# Patient Record
Sex: FEMALE | Race: BLACK OR AFRICAN AMERICAN | Employment: FULL TIME | URBAN - METROPOLITAN AREA
[De-identification: names, ages, dates, MRNs, and addresses within clinical notes are randomized per-mention and may not be internally consistent; named-entity substitution may affect disease eponyms.]

---

## 2017-08-29 ENCOUNTER — OFFICE VISIT (OUTPATIENT)
Dept: INTERNAL MEDICINE CLINIC | Age: 27
End: 2017-08-29

## 2017-08-29 ENCOUNTER — OFFICE VISIT (OUTPATIENT)
Dept: OBGYN CLINIC | Age: 27
End: 2017-08-29

## 2017-08-29 VITALS
HEART RATE: 70 BPM | BODY MASS INDEX: 28.77 KG/M2 | DIASTOLIC BLOOD PRESSURE: 70 MMHG | TEMPERATURE: 97 F | HEIGHT: 66 IN | SYSTOLIC BLOOD PRESSURE: 116 MMHG | WEIGHT: 179 LBS | OXYGEN SATURATION: 99 % | RESPIRATION RATE: 16 BRPM

## 2017-08-29 VITALS
HEIGHT: 66 IN | WEIGHT: 179 LBS | SYSTOLIC BLOOD PRESSURE: 112 MMHG | BODY MASS INDEX: 28.77 KG/M2 | DIASTOLIC BLOOD PRESSURE: 70 MMHG

## 2017-08-29 DIAGNOSIS — M54.50 ACUTE LEFT-SIDED LOW BACK PAIN WITHOUT SCIATICA: Primary | ICD-10-CM

## 2017-08-29 DIAGNOSIS — Z01.419 ENCOUNTER FOR GYNECOLOGICAL EXAMINATION WITHOUT ABNORMAL FINDING: Primary | ICD-10-CM

## 2017-08-29 DIAGNOSIS — Z20.2 POSSIBLE EXPOSURE TO STD: ICD-10-CM

## 2017-08-29 DIAGNOSIS — R82.2 BILIRUBINURIA: ICD-10-CM

## 2017-08-29 LAB
BILIRUB UR QL STRIP: NORMAL
GLUCOSE UR-MCNC: NEGATIVE MG/DL
KETONES P FAST UR STRIP-MCNC: NEGATIVE MG/DL
PH UR STRIP: 5.5 [PH] (ref 4.6–8)
PROT UR QL STRIP: NORMAL MG/DL
SP GR UR STRIP: 1.01 (ref 1–1.03)
UA UROBILINOGEN AMB POC: NORMAL (ref 0.2–1)
URINALYSIS CLARITY POC: CLEAR
URINALYSIS COLOR POC: NORMAL
URINE BLOOD POC: NEGATIVE
URINE LEUKOCYTES POC: NEGATIVE
URINE NITRITES POC: NEGATIVE

## 2017-08-29 NOTE — PROGRESS NOTES
HISTORY OF PRESENT ILLNESS  Stephanie Ferguson is a 23 y.o. female. Patient reports left-sided low back pain for 3 weeks. She thought it was from sleeping on a couch, but it has not improved over the last week since sleeping in a bed. Patient states she also has had mild abdominal tenderness of left and right upper quadrant with some cramping and nausea. She has history of gastric sleeve. Last bowel movement was 2 days ago and was normal per patient. She denies fever, chills. She noticed dark urine about 1 week ago. She thought maybe she was just dehydrated, because she has not had a lot of fluid intake recently. Denies urinary urgency, frequency, or dysuria. She has not taken any over the counter medications, such as AZO, or prescription drugs, besides BC powder for the pain. She has been taking BC powder twice daily for the last week. Patient saw gyn today to be tested for STDs. She had pap done and ultrasound ordered to check Mirena placement. Visit Vitals    /70 (BP 1 Location: Left arm, BP Patient Position: Sitting)    Pulse 70    Temp 97 °F (36.1 °C) (Oral)    Resp 16    Ht 5' 6\" (1.676 m)    Wt 179 lb (81.2 kg)    LMP 08/15/2017 (Exact Date)    SpO2 99%    BMI 28.89 kg/m2       LOW BACK PAIN   The history is provided by the patient. This is a new problem. Episode onset: 3 weeks. The problem occurs constantly. The problem has not changed since onset. Treatments tried: BC powder. The treatment provided moderate relief. Review of Systems   Genitourinary:        Dark urine   Musculoskeletal: Positive for back pain (left side). Physical Exam   Constitutional: She is oriented to person, place, and time. She appears well-developed and well-nourished. Abdominal: Normal appearance and bowel sounds are normal. There is no hepatosplenomegaly. There is tenderness (mild tenderness of left and right upper quadrant with deep palpation). There is no CVA tenderness.    Musculoskeletal:   No back pain at time of visit   Neurological: She is alert and oriented to person, place, and time. Skin: Skin is warm and dry. Psychiatric: She has a normal mood and affect. Results for orders placed or performed in visit on 08/29/17   AMB POC URINALYSIS DIP STICK AUTO W/O MICRO   Result Value Ref Range    Color (UA POC) Linda     Clarity (UA POC) Clear     Glucose (UA POC) Negative Negative    Bilirubin (UA POC) 4+ Negative    Ketones (UA POC) Negative Negative    Specific gravity (UA POC) 1.015 1.001 - 1.035    Blood (UA POC) Negative Negative    pH (UA POC) 5.5 4.6 - 8.0    Protein (UA POC) Trace Negative mg/dL    Urobilinogen (UA POC) 8 mg/dL 0.2 - 1    Nitrites (UA POC) Negative Negative    Leukocyte esterase (UA POC) Negative Negative       ASSESSMENT and PLAN    ICD-10-CM ICD-9-CM    1. Acute left-sided low back pain without sciatica M54.5 724.2 AMB POC URINALYSIS DIP STICK AUTO W/O MICRO   2.  Bilirubinuria R82.2 791.4 HEPATITIS PANEL, ACUTE      CBC WITH AUTOMATED DIFF      METABOLIC PANEL, COMPREHENSIVE      BILIRUBIN, FRACTIONATED     Orders Placed This Encounter    HEPATITIS PANEL, ACUTE    CBC WITH AUTOMATED DIFF    METABOLIC PANEL, COMPREHENSIVE    BILIRUBIN, FRACTIONATED    AMB POC URINALYSIS DIP STICK AUTO W/O MICRO   labs sent today  Consider imaging based on lab results  Avoid [facility] powder/tylenol  Try heating pad for pain

## 2017-08-29 NOTE — MR AVS SNAPSHOT
Visit Information Date & Time Provider Department Dept. Phone Encounter #  
 8/29/2017 10:50 AM Guero Danielson MD [place] [facility] Elias [phone] [id number] Your Appointments 8/29/2017  2:15 PM  
ACUTE CARE with CHICHI Dumont [facility] ([address]) Appt Note: back pain  
 [address], [address] [facility] [address]  
[address], [facility]  
  
    
 9/1/2017  8:20 AM  
ESTABLISHED PATIENT with Jonny Mcguire NP  
[facility] [address] ([address]) Appt Note: EP/ yearly check up/ 11/20/2013 Sleeve - EB/$0.00cp/$0.00pb; EP    Patient rescheduled appointment to 09/0l/20l7        vca  
 217 [facility] [address] [facility] [address]  
[address] [place] [address] Upcoming Health Maintenance Date Due INFLUENZA AGE 9 TO ADULT 8/1/2017 PAP AKA CERVICAL CYTOLOGY 3/3/2019 Allergies as of 8/29/2017  Review Complete On: 8/29/2017 By: To Calderon No Known Allergies Current Immunizations  Reviewed on 8/7/2015 Name Date DTAP Vaccine 1/3/1996, 6/23/1994, 7/7/1992, 5/7/1992, 3/7/1992 Hepatitis B Vaccine 4/27/2004, 1/30/2004, 12/9/2003, 8/4/1996, 2/26/1996, 1/3/1996 Human Papillomavirus 10/24/2007, 11/28/2006 IPV 7/3/1996, 1/3/1996, 5/7/1992, 3/7/1992 MMR Vaccine 1/3/1996, 2/18/1993 Meningococcal Vaccine 7/14/2011 TDAP Vaccine 7/14/2011 Tdap 2/15/2013 12:11 AM  
  
 Not reviewed this visit You Were Diagnosed With   
  
 Codes Comments Encounter for gynecological examination without abnormal finding    -  Primary ICD-10-CM: Z12.363 ICD-9-CM: V72.31 Possible exposure to STD     ICD-10-CM: Z20.2 ICD-9-CM: V01.6 Vitals BP Height(growth percentile) Weight(growth percentile) BMI OB Status Smoking Status 112/70 5' 6\" (1.676 m) 179 lb (81.2 kg) 28.89 kg/m2 Having regular periods Never Smoker BMI and BSA Data Body Mass Index Body Surface Area  
 28.89 kg/m 2 1.94 m 2 Preferred Pharmacy Pharmacy Name Phone [facility] [address] [place] [address], [facility] [place] [phone] Your Updated Medication List  
  
   
This list is accurate as of: 8/29/17 10:51 AM.  Always use your most recent med list.  
  
  
  
  
 biotin 500 mcg Cap Take 1,000 mcg by mouth daily. CALCIUM 600 + D 600-125 mg-unit Tab Generic drug:  calcium-cholecalciferol (d3) Take  by mouth. MIRENA 20 mcg/24 hr (5 years) IUD Generic drug:  levonorgestrel 1 Each by IntraUTERine route once. multivitamin tablet Commonly known as:  ONE A DAY Take 2 Tabs by mouth daily. VITAMIN B-12 PO Take  by mouth. We Performed the Following HEP B SURFACE AG [id number] CPT(R)] HEPATITIS C AB [93535 CPT(R)] HIV 1/2 AG/AB, 4TH GENERATION,W RFLX CONFIRM [[id number] Custom] PAP IG, CT-NG TV [address] HPV [id number], [id number]) [id number] CPT(R)] RPR [59380 CPT(R)] Patient Instructions Exposure to Sexually Transmitted Infections: Care Instructions Your Care Instructions Sexually transmitted infections (STIs) are those diseases spread by sexual contact. There are at least 20 different STIs, including chlamydia, gonorrhea, syphilis, and human immunodeficiency virus (HIV), which causes AIDS. Bacteria-caused STIs can be treated and cured. STIs caused by viruses, such as HIV, can be treated but not cured. Some STIs can reduce a woman's chances of getting pregnant in the future. STIs are spread during sexual contact, such as vaginal intercourse and oral or anal sex. Follow-up care is a key part of your treatment and safety.  Be sure to make and go to all appointments, and call your doctor if you are having problems. Its also a good idea to know your test results and keep a list of the medicines you take. How can you care for yourself at home? · Your doctor may have given you a shot of antibiotics. If your doctor prescribed antibiotic pills, take them as directed. Do not stop taking them just because you feel better. You need to take the full course of antibiotics. · Do not have sexual contact while you have symptoms of an STI or are being treated for an STI. · Tell your sex partner (or partners) that he or she will need treatment. · If you are a woman, do not douche. Douching changes the normal balance of bacteria in the vagina and may spread an infection up into your reproductive organs. To prevent exposure to STIs in the future · Use latex condoms every time you have sex. Use them from the beginning to the end of sexual contact. · Talk to your partner before you have sex. Find out if he or she has or is at risk for any STI. Keep in mind that a person may be able to spread an STI even if he or she does not have symptoms. · Do not have sex if you are being treated for an STI. · Do not have sex with anyone who has symptoms of an STI, such as sores on the genitals or mouth. · Having one sex partner (who does not have STIs and does not have sex with anyone else) is a good way to avoid STIs. When should you call for help? Call your doctor now or seek immediate medical care if: 
· You have new pain in your belly or pelvis. · You have symptoms of a urinary tract infection. These may include: 
¨ Pain or burning when you urinate. ¨ A frequent need to urinate without being able to pass much urine. ¨ Pain in the flank, which is just below the rib cage and above the waist on either side of the back. ¨ Blood in your urine. ¨ A fever. · You have new or worsening pain or swelling in the scrotum. Watch closely for changes in your health, and be sure to contact your doctor if: · You have unusual vaginal bleeding. · You have a discharge from the vagina or penis. · You have any new symptoms, such as sores, bumps, rashes, blisters, or warts. · You have itching, tingling, pain, or burning in the genital or anal area. · You think you may have an STI. Where can you learn more? Go to http://laith-hammad.info/. Enter Z103 in the search box to learn more about \"Exposure to Sexually Transmitted Infections: Care Instructions. \" Current as of: March 20, 2017 Content Version: 11.3 © 4755-3204 [employer]. Care instructions adapted under license by [employer] (which disclaims liability or warranty for this information). If you have questions about a medical condition or this instruction, always ask your healthcare professional. [address] any warranty or liability for your use of this information. Introducing [facility]! Dear Quinn Schulz: Thank you for requesting a [employer] account. Our records indicate that you already have an active [employer] account. You can access your account anytime at [email or web address] Did you know that you can access your hospital and ER discharge instructions at any time in [employer]? You can also review all of your test results from your hospital stay or ER visit. Additional Information If you have questions, please visit the Frequently Asked Questions section of the [employer] website at [email or web address] Remember, [employer] is NOT to be used for urgent needs. For medical emergencies, dial 911. Now available from your iPhone and Android! Please provide this summary of care documentation to your next provider. Your primary care clinician is listed as [address]. If you have any questions after today's visit, please call [phone].

## 2017-08-29 NOTE — MR AVS SNAPSHOT
Visit Information Date & Time Provider Department Dept. Phone Encounter #  
 8/29/2017  2:15 PM CHICHI Curiel  [facility] [phone] [id number] Your Appointments 9/1/2017  8:20 AM  
ESTABLISHED PATIENT with Grace Xie NP  
[facility] 22 [address] ([facility]) Appt Note: EP/ yearly check up/ 11/20/2013 Sleeve - EB/$0.00cp/$0.00pb; EP    Patient rescheduled appointment to 09/0l/20l7        vca  
 217 [facility] [address] [facility] [address]  
[address] [address]  
  
    
 9/21/2017  9:00 AM  
ULTRASOUND with Lissa Moctezuma ([facility]) Appt Note: u/s (iud check) then f/u FW  
 [address] [facility] [address]  
[address]  
  
    
 9/21/2017  9:30 AM  
FOLLOW UP 10 with MD Osmany Heller ([facility]) Appt Note: u/s (iud check) then f/u FW  
 [address] [facility] [address] [facility] [address] [address]  
[address] Upcoming Health Maintenance Date Due  
 FOOT EXAM Q1 12/18/2001 MICROALBUMIN Q1 12/18/2001 EYE EXAM RETINAL OR DILATED Q1 12/18/2001 Pneumococcal 19-64 Medium Risk (1 of 1 - PPSV23) 12/18/2010 LIPID PANEL Q1 11/5/2014 HEMOGLOBIN A1C Q6M 2/7/2016 INFLUENZA AGE 9 TO ADULT 8/1/2017 PAP AKA CERVICAL CYTOLOGY 3/3/2019 DTaP/Tdap/Td series (7 - Td) 2/15/2023 Allergies as of 8/29/2017  Review Complete On: 8/29/2017 By: Henry Shafer MD  
 No Known Allergies Current Immunizations  Reviewed on 8/7/2015 Name Date DTAP Vaccine 1/3/1996, 6/23/1994, 7/7/1992, 5/7/1992, 3/7/1992 Hepatitis B Vaccine 4/27/2004, 1/30/2004, 12/9/2003, 8/4/1996, 2/26/1996, 1/3/1996 Human Papillomavirus 10/24/2007, 11/28/2006 IPV 7/3/1996, 1/3/1996, 5/7/1992, 3/7/1992 MMR Vaccine 1/3/1996, 2/18/1993 Meningococcal Vaccine 7/14/2011 TDAP Vaccine 7/14/2011 Tdap 2/15/2013 12:11 AM  
  
 Not reviewed this visit You Were Diagnosed With   
  
 Codes Comments Acute left-sided low back pain without sciatica    -  Primary ICD-10-CM: M54.5 ICD-9-CM: 724.2 Bilirubinuria     ICD-10-CM: R82.2 ICD-9-CM: 791.4 Vitals BP Pulse Temp Resp Height(growth percentile) Weight(growth percentile) 116/70 (BP 1 Location: Left arm, BP Patient Position: Sitting) 70 97 °F (36.1 °C) (Oral) 16 5' 6\" (1.676 m) 179 lb (81.2 kg) LMP SpO2 BMI OB Status Smoking Status 08/15/2017 (Exact Date) 99% 28.89 kg/m2 Having regular periods Never Smoker Vitals History BMI and BSA Data Body Mass Index Body Surface Area  
 28.89 kg/m 2 1.94 m 2 Preferred Pharmacy Pharmacy Name Phone [facility] [address] [place] [address] [phone] Your Updated Medication List  
  
   
This list is accurate as of: 8/29/17  2:58 PM.  Always use your most recent med list.  
  
  
  
  
 biotin 500 mcg Cap Take 1,000 mcg by mouth daily. CALCIUM 600 + D 600-125 mg-unit Tab Generic drug:  calcium-cholecalciferol (d3) Take  by mouth. MIRENA 20 mcg/24 hr (5 years) IUD Generic drug:  levonorgestrel 1 Each by IntraUTERine route once. multivitamin tablet Commonly known as:  ONE A DAY Take 2 Tabs by mouth daily. VITAMIN B-12 PO Take  by mouth. We Performed the Following AMB POC URINALYSIS DIP STICK AUTO W/O MICRO [12724 CPT(R)] BILIRUBIN, FRACTIONATED [id number] CPT(R)] CBC WITH AUTOMATED DIFF [56720 CPT(R)] HEPATITIS PANEL, ACUTE [63711 CPT(R)] METABOLIC PANEL, COMPREHENSIVE [81446 CPT(R)] Introducing [facility]! Dear Karyn Hernandez: Thank you for requesting a [employer] account. Our records indicate that you already have an active [employer] account. You can access your account anytime at [email or web address] Did you know that you can access your hospital and ER discharge instructions at any time in [employer]? You can also review all of your test results from your hospital stay or ER visit. Additional Information If you have questions, please visit the Frequently Asked Questions section of the [employer] website at [email or web address] Remember, [employer] is NOT to be used for urgent needs. For medical emergencies, dial 911. Now available from your iPhone and Android! Please provide this summary of care documentation to your next provider. Your primary care clinician is listed as [address]. If you have any questions after today's visit, please call [phone].

## 2017-08-29 NOTE — PATIENT INSTRUCTIONS
Exposure to Sexually Transmitted Infections: Care Instructions  Your Care Instructions  Sexually transmitted infections (STIs) are those diseases spread by sexual contact. There are at least 20 different STIs, including chlamydia, gonorrhea, syphilis, and human immunodeficiency virus (HIV), which causes AIDS. Bacteria-caused STIs can be treated and cured. STIs caused by viruses, such as HIV, can be treated but not cured. Some STIs can reduce a woman's chances of getting pregnant in the future. STIs are spread during sexual contact, such as vaginal intercourse and oral or anal sex. Follow-up care is a key part of your treatment and safety. Be sure to make and go to all appointments, and call your doctor if you are having problems. Its also a good idea to know your test results and keep a list of the medicines you take. How can you care for yourself at home? · Your doctor may have given you a shot of antibiotics. If your doctor prescribed antibiotic pills, take them as directed. Do not stop taking them just because you feel better. You need to take the full course of antibiotics. · Do not have sexual contact while you have symptoms of an STI or are being treated for an STI. · Tell your sex partner (or partners) that he or she will need treatment. · If you are a woman, do not douche. Douching changes the normal balance of bacteria in the vagina and may spread an infection up into your reproductive organs. To prevent exposure to STIs in the future  · Use latex condoms every time you have sex. Use them from the beginning to the end of sexual contact. · Talk to your partner before you have sex. Find out if he or she has or is at risk for any STI. Keep in mind that a person may be able to spread an STI even if he or she does not have symptoms. · Do not have sex if you are being treated for an STI. · Do not have sex with anyone who has symptoms of an STI, such as sores on the genitals or mouth.   · Having one sex partner (who does not have STIs and does not have sex with anyone else) is a good way to avoid STIs. When should you call for help? Call your doctor now or seek immediate medical care if:  · You have new pain in your belly or pelvis. · You have symptoms of a urinary tract infection. These may include:  ¨ Pain or burning when you urinate. ¨ A frequent need to urinate without being able to pass much urine. ¨ Pain in the flank, which is just below the rib cage and above the waist on either side of the back. ¨ Blood in your urine. ¨ A fever. · You have new or worsening pain or swelling in the scrotum. Watch closely for changes in your health, and be sure to contact your doctor if:  · You have unusual vaginal bleeding. · You have a discharge from the vagina or penis. · You have any new symptoms, such as sores, bumps, rashes, blisters, or warts. · You have itching, tingling, pain, or burning in the genital or anal area. · You think you may have an STI. Where can you learn more? Go to http://laith-hammad.info/. Enter I387 in the search box to learn more about \"Exposure to Sexually Transmitted Infections: Care Instructions. \"  Current as of: March 20, 2017  Content Version: 11.3  © 5617-0176 [employer]. Care instructions adapted under license by [employer] (which disclaims liability or warranty for this information). If you have questions about a medical condition or this instruction, always ask your healthcare professional. [place] [address] any warranty or liability for your use of this information.

## 2017-08-29 NOTE — PROGRESS NOTES
Chief Complaint   Patient presents with    Pain (Chronic)     lower back pain started three weeks ago. 1. Have you been to the ER, urgent care clinic since your last visit? No  Hospitalized since your last visit? No    2. Have you seen or consulted any other health care providers outside of the [address] [place] [address] since your last visit? Yes, OB/GYN  Include any pap smears or colon screening.  No

## 2017-08-29 NOTE — PROGRESS NOTES
Annual exam ages 22-44    [facility] Giovani Lange is a No obstetric history on file. ,  23 y.o. female [address] No LMP recorded. .    She presents for her annual checkup. She is having significant irregualr menses with IUD and she wants std testing today. With regard to the Gardasil vaccine, she has received all 3 injections. Menstrual status:    Her periods are light in flow. She is using three to five pads or tampons per day, irreguar with IUD. She denies dysmenorrhea. She reports no premenstrual symptoms. Contraception:    The current method of family planning is IUD. Sexual history:     She  reports that she currently engages in sexual activity and has had male partners. She reports using the following method of birth control/protection: IUD. Artist Neo Medical conditions:    Since her last annual GYN exam about one year ago, she has not the following changes in her health history: none. Pap and Mammogram History:    Her most recent Pap smear was abnormal, obtained 1 year(s) ago. The patient has never had a mammogram.    Breast Cancer History/Substance Abuse: negative    Past Medical History:   Diagnosis Date    Cervical high risk HPV (human papillomavirus) test positive 03/2016    History of sleeve gastrectomy 11/2013    Ledesma    Pap smear for cervical cancer screening 3/3/16 ASCUS HPV POS    Prediabetes     before sleeve gastrectomy, Metformin     Past Surgical History:   Procedure Laterality Date    HX GASTRECTOMY  11/20/13    sleeve by Dr Maira Bernal      IUD placement in August 2011 replacement 8/3/16    HX HEENT      WISDOM TEETH EXTRACTION       Current Outpatient Prescriptions   Medication Sig Dispense Refill    levonorgestrel (MIRENA) 20 mcg/24 hr IUD 1 Each by IntraUTERine route once.  calcium-cholecalciferol, d3, (CALCIUM 600 + D) 600-125 mg-unit tab Take  by mouth.  CYANOCOBALAMIN, VITAMIN B-12, (VITAMIN B-12 PO) Take  by mouth.       multivitamin (ONE A DAY) tablet Take 2 Tabs by mouth daily.  biotin 500 mcg cap Take 1,000 mcg by mouth daily. Allergies: Review of patient's allergies indicates no known allergies. Tobacco History:  reports that she has never smoked. She has never used smokeless tobacco.  Alcohol Abuse:  reports that she drinks about 1.8 oz of alcohol per week   Drug Abuse:  reports that she does not use illicit drugs.     Family Medical/Cancer History:   Family History   Problem Relation Age of Onset    Asthma Mother     Anxiety Mother     Arthritis-osteo Mother     Diabetes Maternal Grandmother         Review of Systems - History obtained from the patient  Constitutional: negative for weight loss, fever, night sweats  HEENT: negative for hearing loss, earache, congestion, snoring, sorethroat  CV: negative for chest pain, palpitations, edema  Resp: negative for cough, shortness of breath, wheezing  GI: negative for change in bowel habits, abdominal pain, black or bloody stools  : negative for frequency, dysuria, hematuria, vaginal discharge  MSK: negative for back pain, joint pain, muscle pain  Breast: negative for breast lumps, nipple discharge, galactorrhea  Skin :negative for itching, rash, hives  Neuro: negative for dizziness, headache, confusion, weakness  Psych: negative for anxiety, depression, change in mood  Heme/lymph: negative for bleeding, bruising, pallor    Physical Exam    Visit Vitals    /70    Ht 5' 6\" (1.676 m)    Wt 179 lb (81.2 kg)    BMI 28.89 kg/m2       Constitutional  · Appearance: well-nourished, well developed, alert, in no acute distress    HENT  · Head and Face: appears normal    Neck  · Inspection/Palpation: normal appearance, no masses or tenderness  · Lymph Nodes: no lymphadenopathy present  · Thyroid: gland size normal, nontender, no nodules or masses present on palpation    Chest  · Respiratory Effort: breathing unlabored    Breasts  · Inspection of Breasts: breasts symmetrical, no skin changes, no discharge present, nipple appearance normal, no skin retraction present  · Palpation of Breasts and Axillae: no masses present on palpation, no breast tenderness  · Axillary Lymph Nodes: no lymphadenopathy present    Gastrointestinal  · Abdominal Examination: abdomen non-tender to palpation, normal bowel sounds, no masses present  · Liver and spleen: no hepatomegaly present, spleen not palpable  · Hernias: no hernias identified    Genitourinary  · External Genitalia: normal appearance for age, no discharge present, no tenderness present, no inflammatory lesions present, no masses present, no atrophy present  · Vagina: normal vaginal vault without central or paravaginal defects, no discharge present, no inflammatory lesions present, no masses present  · Bladder: non-tender to palpation  · Urethra: appears normal  · Cervix: normal   · Uterus: normal size, shape and consistency  · Adnexa: no adnexal tenderness present, no adnexal masses present  · Perineum: perineum within normal limits, no evidence of trauma, no rashes or skin lesions present  · Anus: anus within normal limits, no hemorrhoids present  · Inguinal Lymph Nodes: no lymphadenopathy present    Skin  · General Inspection: no rash, no lesions identified    Neurologic/Psychiatric  · Mental Status:  · Orientation: grossly oriented to person, place and time  · Mood and Affect: mood normal, affect appropriate    . Assessment:  Routine gynecologic examination  Her current medical status is satisfactory with no evidence of significant gynecologic issues. AUB- will rto for ultrasound to check iud position. If normal consider ocps x 1-2 months.     Plan:  Counseled re: diet, exercise, healthy lifestyle  Return for yearly wellness visits

## 2017-08-30 ENCOUNTER — TELEPHONE (OUTPATIENT)
Dept: INTERNAL MEDICINE CLINIC | Age: 27
End: 2017-08-30

## 2017-08-30 DIAGNOSIS — R79.89 ABNORMAL LFTS (LIVER FUNCTION TESTS): Primary | ICD-10-CM

## 2017-08-30 LAB
ALBUMIN SERPL-MCNC: 4.1 G/DL (ref 3.5–5.5)
ALBUMIN/GLOB SERPL: 1.4 {RATIO} (ref 1.2–2.2)
ALP SERPL-CCNC: 480 IU/L (ref 39–117)
ALT SERPL-CCNC: 630 IU/L (ref 0–32)
AST SERPL-CCNC: 505 IU/L (ref 0–40)
BASOPHILS # BLD AUTO: 0 X10E3/UL (ref 0–0.2)
BASOPHILS NFR BLD AUTO: 0 %
BILIRUB DIRECT SERPL-MCNC: 2.68 MG/DL (ref 0–0.4)
BILIRUB INDIRECT SERPL-MCNC: 0.82 MG/DL (ref 0.1–0.8)
BILIRUB SERPL-MCNC: 3.5 MG/DL (ref 0–1.2)
BUN SERPL-MCNC: 7 MG/DL (ref 6–20)
BUN/CREAT SERPL: 11 (ref 9–23)
CALCIUM SERPL-MCNC: 9.2 MG/DL (ref 8.7–10.2)
CHLORIDE SERPL-SCNC: 98 MMOL/L (ref 96–106)
CO2 SERPL-SCNC: 26 MMOL/L (ref 18–29)
CREAT SERPL-MCNC: 0.64 MG/DL (ref 0.57–1)
EOSINOPHIL # BLD AUTO: 0 X10E3/UL (ref 0–0.4)
EOSINOPHIL NFR BLD AUTO: 1 %
ERYTHROCYTE [DISTWIDTH] IN BLOOD BY AUTOMATED COUNT: 14.2 % (ref 12.3–15.4)
GLOBULIN SER CALC-MCNC: 2.9 G/DL (ref 1.5–4.5)
GLUCOSE SERPL-MCNC: 82 MG/DL (ref 65–99)
HAV IGM SERPL QL IA: NEGATIVE
HBV CORE IGM SERPL QL IA: NEGATIVE
HBV SURFACE AG SERPL QL IA: NEGATIVE
HBV SURFACE AG SERPL QL IA: NEGATIVE
HCT VFR BLD AUTO: 35.1 % (ref 34–46.6)
HCV AB S/CO SERPL IA: <0.1 S/CO RATIO (ref 0–0.9)
HCV AB S/CO SERPL IA: <0.1 S/CO RATIO (ref 0–0.9)
HGB BLD-MCNC: 11.2 G/DL (ref 11.1–15.9)
HIV 1+2 AB+HIV1 P24 AG SERPL QL IA: NON REACTIVE
IMM GRANULOCYTES # BLD: 0 X10E3/UL (ref 0–0.1)
IMM GRANULOCYTES NFR BLD: 0 %
LYMPHOCYTES # BLD AUTO: 1.6 X10E3/UL (ref 0.7–3.1)
LYMPHOCYTES NFR BLD AUTO: 43 %
MCH RBC QN AUTO: 29.9 PG (ref 26.6–33)
MCHC RBC AUTO-ENTMCNC: 31.9 G/DL (ref 31.5–35.7)
MCV RBC AUTO: 94 FL (ref 79–97)
MONOCYTES # BLD AUTO: 0.2 X10E3/UL (ref 0.1–0.9)
MONOCYTES NFR BLD AUTO: 5 %
NEUTROPHILS # BLD AUTO: 1.9 X10E3/UL (ref 1.4–7)
NEUTROPHILS NFR BLD AUTO: 51 %
PLATELET # BLD AUTO: 250 X10E3/UL (ref 150–379)
POTASSIUM SERPL-SCNC: 4.2 MMOL/L (ref 3.5–5.2)
PROT SERPL-MCNC: 7 G/DL (ref 6–8.5)
RBC # BLD AUTO: 3.75 X10E6/UL (ref 3.77–5.28)
RPR SER QL: NON REACTIVE
SODIUM SERPL-SCNC: 139 MMOL/L (ref 134–144)
WBC # BLD AUTO: 3.7 X10E3/UL (ref 3.4–10.8)

## 2017-08-30 NOTE — TELEPHONE ENCOUNTER
Spoke with Dr. Misbah Carreon. Informed him of patient's lab results and presenting symptoms. He stated he would have patient's appointment moved up. I also called radiology scheduling and had ultrasound moved to tomorrow. Called patient to update. Patient will arrive at imaging center at 12:30 tomorrow with nothing to eat or drink for 8 hours. Reinforced no alcohol or tylenol.

## 2017-08-30 NOTE — TELEPHONE ENCOUNTER
Patient notified of elevated LFTs and bilirubin; liver ultrasound ordered, referral given to hepatology-Dr. Constanza Reagan.

## 2017-08-31 ENCOUNTER — HOSPITAL ENCOUNTER (OUTPATIENT)
Dept: ULTRASOUND IMAGING | Age: 27
Discharge: HOME | End: 2017-08-31
Attending: NURSE PRACTITIONER
Payer: COMMERCIAL

## 2017-08-31 ENCOUNTER — TELEPHONE (OUTPATIENT)
Dept: HEMATOLOGY | Age: 27
End: 2017-08-31

## 2017-08-31 DIAGNOSIS — R79.89 ABNORMAL LFTS (LIVER FUNCTION TESTS): ICD-10-CM

## 2017-08-31 PROCEDURE — 76705 ECHO EXAM OF ABDOMEN: CPT

## 2017-08-31 NOTE — TELEPHONE ENCOUNTER
8/30/17 - Spoke to Mario Borrero NP regarding patient. Advised to contact Dr. Mohan Aguilar to ok sooner appointment. 8/31/2017 - Spoke to Dr. Mohan Aguilar and inquired about sooner appointment. Ok per Dr. Mohan Aguilar. Phone call placed to patient. Received VM. Left message asking for a return call to discuss scheduling appointment. Patient returned call. Scheduled appointment for tomorrow, 9/1.

## 2017-09-01 ENCOUNTER — OFFICE VISIT (OUTPATIENT)
Dept: HEMATOLOGY | Age: 27
End: 2017-09-01

## 2017-09-01 VITALS
HEIGHT: 66 IN | HEART RATE: 78 BPM | DIASTOLIC BLOOD PRESSURE: 84 MMHG | WEIGHT: 173.4 LBS | SYSTOLIC BLOOD PRESSURE: 111 MMHG | TEMPERATURE: 97.8 F | OXYGEN SATURATION: 95 % | BODY MASS INDEX: 27.87 KG/M2

## 2017-09-01 DIAGNOSIS — B17.9 ACUTE HEPATITIS: Primary | ICD-10-CM

## 2017-09-01 PROBLEM — E03.9 HYPOTHYROIDISM: Status: ACTIVE | Noted: 2017-09-01

## 2017-09-01 NOTE — MR AVS SNAPSHOT
Visit Information Date & Time Provider Department Dept. Phone Encounter #  
 9/1/2017  8:45 AM [facility] [address] [place] [address] [id number] Follow-up Instructions Return in about 1 week (around 9/8/2017). Your Appointments 9/21/2017  9:00 AM  
ULTRASOUND with SURY SEGURA [employer] ([address] [facility]) Appt Note: u/s (iud check) then f/u FW  
 [address] [facility] [address] [facility] [address]  
[address]  
  
    
 9/21/2017  9:30 AM  
FOLLOW UP 10 with Josey Garza MD  
[employer] ([address] [facility]) Appt Note: u/s (iud check) then f/u FW  
 [address] [facility] [address] [facility] [address]  
[address] Upcoming Health Maintenance Date Due  
 FOOT EXAM Q1 12/18/2001 MICROALBUMIN Q1 12/18/2001 EYE EXAM RETINAL OR DILATED Q1 12/18/2001 Pneumococcal 19-64 Medium Risk (1 of 1 - PPSV23) 12/18/2010 LIPID PANEL Q1 11/5/2014 HEMOGLOBIN A1C Q6M 2/7/2016 INFLUENZA AGE 9 TO ADULT 8/1/2017 PAP AKA CERVICAL CYTOLOGY 3/3/2019 DTaP/Tdap/Td series (7 - Td) 2/15/2023 Allergies as of 9/1/2017  Review Complete On: 9/1/2017 By: Opal Rodríguez MD  
 No Known Allergies Current Immunizations  Reviewed on 8/7/2015 Name Date DTAP Vaccine 1/3/1996, 6/23/1994, 7/7/1992, 5/7/1992, 3/7/1992 Hepatitis B Vaccine 4/27/2004, 1/30/2004, 12/9/2003, 8/4/1996, 2/26/1996, 1/3/1996 Human Papillomavirus 10/24/2007, 11/28/2006 IPV 7/3/1996, 1/3/1996, 5/7/1992, 3/7/1992 MMR Vaccine 1/3/1996, 2/18/1993 Meningococcal Vaccine 7/14/2011 TDAP Vaccine 7/14/2011 Tdap 2/15/2013 12:11 AM  
  
 Not reviewed this visit You Were Diagnosed With   
  
 Codes Comments Acute hepatitis    -  Primary ICD-10-CM: B17.9 ICD-9-CM: 787 Vitals BP Pulse Temp Height(growth percentile) Weight(growth percentile) 111/84 (BP 1 Location: Left arm, BP Patient Position: Sitting) 78 97.8 °F (36.6 °C) (Tympanic) 5' 6\" (1.676 m) 173 lb 6.4 oz (78.7 kg) LMP SpO2 BMI OB Status Smoking Status 08/15/2017 (Exact Date) 95% 27.99 kg/m2 Having regular periods Never Smoker Vitals History BMI and BSA Data Body Mass Index Body Surface Area  
 27.99 kg/m 2 1.91 m 2 Preferred Pharmacy Pharmacy Name Phone [facility] [address], [address] [place] [address] [phone] Your Updated Medication List  
  
   
This list is accurate as of: 9/1/17  9:35 AM.  Always use your most recent med list.  
  
  
  
  
 biotin 500 mcg Cap Take 1,000 mcg by mouth daily. CALCIUM 600 + D 600-125 mg-unit Tab Generic drug:  calcium-cholecalciferol (d3) Take  by mouth. MIRENA 20 mcg/24 hr (5 years) IUD Generic drug:  levonorgestrel 1 Each by IntraUTERine route once. multivitamin tablet Commonly known as:  ONE A DAY Take 2 Tabs by mouth daily. VITAMIN B-12 PO Take  by mouth. We Performed the Following ACTIN (SMOOTH MUSCLE) ANTIBODY L7007606 CPT(R)] ANTI-NEUTROPHIL CYTOPLASMIC AB [id number] CPT(R)] ANTINUCLEAR ANTIBODIES, IFA [id number] CPT(R)] CBC WITH AUTOMATED DIFF [07907 CPT(R)] CERULOPLASMIN [id number] CPT(R)] HEP A AB, TOTAL [id number] CPT(R)] HEP B SURFACE AB [id number] CPT(R)] HEPATIC FUNCTION PANEL [26525 CPT(R)] HEPATITIS B CORE AB, TOTAL F4562501 CPT(R)] LIVER-KIDNEY MICROSOMAL AB [id number] CPT(R)] METABOLIC PANEL, BASIC [73896 CPT(R)] MITOCHONDRIAL M2 AB [id number] CPT(R)] PROTHROMBIN TIME + INR [99852 CPT(R)] Follow-up Instructions Return in about 1 week (around 9/8/2017). Introducing [facility]! Dear Monik Ford Cliff: Thank you for requesting a [employer] account. Our records indicate that you already have an active [employer] account. You can access your account anytime at [email or web address] Did you know that you can access your hospital and ER discharge instructions at any time in [employer]? You can also review all of your test results from your hospital stay or ER visit. Additional Information If you have questions, please visit the Frequently Asked Questions section of the [employer] website at [email or web address] Remember, [employer] is NOT to be used for urgent needs. For medical emergencies, dial 911. Now available from your iPhone and Android! Please provide this summary of care documentation to your next provider. Your primary care clinician is listed as Debra Kimble. If you have any questions after today's visit, please call [phone].

## 2017-09-01 NOTE — PROGRESS NOTES
[address] Herrera MD, Dmitri Morris, [facility], [place]       CHICHI Trevizo PA-C Mee Macleod, ROBERT-BC   CHICHI Thomas NP        at [address], [address] Radha Saucedo  22.     [phone]     FAX: [phone]    at [address], [address] [place] [address], [address]     [phone]     FAX: [phone]       Patient Care Team:  Jovan Dangelo NP as PCP - General (Nurse Practitioner)  Spencer Pugh MD (Obstetrics & Gynecology)      Problem List  Date Reviewed: 9/1/2017          Codes Class Noted    Status post laparoscopic sleeve gastrectomy ICD-10-CM: Z98.84  ICD-9-CM: V45.86  10/17/2014    Overview Signed 10/17/2014  2:19 PM by Dodie Brooks NP     November 2013             PCO (polycystic ovaries) ICD-10-CM: E28.2  ICD-9-CM: 256.4  7/12/2011    Overview Signed 1/30/2013 12:09 PM by Dayanna Del Valle NP     Diagnosed during teenage years. The physicians listed above have asked me to see Julio Walls in consultation regarding elevated liver enzymes and its management. All medical records sent by the referring physicians were reviewed including imaging studies     The patient is a 23 y.o. Black female who was first noted to have abnormalities in liver transaminases and alkaline phosphate in 8/2017. Serologic evaluation for markers of chronic liver disease were either not performed or available to me. Ultrasound of the liver was performed in 8/2017. The results of the imaging suggested chronic liver disease or fatty liver disease. An assessment of liver fibrosis with biopsy or elastography has not been performed. The patient recently started taking a vitamin hair supplement in 7/2017. This medication was discontinued prior to the liver enzymes being noted to be elevated.     The most recent laboratory studies indicate that the liver transaminases are in the 600-800 range. ALP is in the 400 range, total bilirubin is mildly elevated in the 203 range, albumin is  normal, and the platelet count is normal.    The patient notes yellowing of the eyes or skin,     The patient completes all daily activities without any functional limitations. The patient has not experienced fatigue, pain in the right side over the liver, arthralgias, myalgias,     ALLERGIES  No Known Allergies    MEDICATIONS  Current Outpatient Prescriptions   Medication Sig    levonorgestrel (MIRENA) 20 mcg/24 hr IUD 1 Each by IntraUTERine route once.  calcium-cholecalciferol, d3, (CALCIUM 600 + D) 600-125 mg-unit tab Take  by mouth.  CYANOCOBALAMIN, VITAMIN B-12, (VITAMIN B-12 PO) Take  by mouth.  multivitamin (ONE A DAY) tablet Take 2 Tabs by mouth daily.  biotin 500 mcg cap Take 1,000 mcg by mouth daily. No current facility-administered medications for this visit. SYSTEM REVIEW NOT RELATED TO LIVER DISEASE OR REVIEWED ABOVE:  Constitution systems: Negative for fever, chills, weight gain, weight loss. Eyes: Negative for visual changes. ENT: Negative for sore throat, painful swallowing. Respiratory: Negative for cough, hemoptysis, SOB. Cardiology: Negative for chest pain, palpitations. GI:  Negative for constipation or diarrhea. : Negative for urinary frequency, dysuria, hematuria, nocturia. Skin: Negative for rash. Hematology: Negative for easy bruising, blood clots. Musculo-skelatal: Negative for back pain, muscle pain, weakness. Neurologic: Negative for headaches, dizziness, vertigo, memory problems not related to HE. Psychology: Negative for anxiety, depression. FAMILY HISTORY:  The patient has no knowledge of the father's medical condition. The mother has thyroid disease and arthritis. There is no family history of liver disease.       SOCIAL HISTORY:  The patient has never been . The patient has no children. The patient has never used tobacco products. The patient consumes alcohol on social occasions never in excess. The patient currently works and takes courses on line. She is in her rubi year. She works full time for [employer] in the [employer] center. PHYSICAL EXAMINATION:  Visit Vitals    /84 (BP 1 Location: Left arm, BP Patient Position: Sitting)    Pulse 78    Temp 97.8 °F (36.6 °C) (Tympanic)    Ht 5' 6\" (1.676 m)    Wt 173 lb 6.4 oz (78.7 kg)    LMP 08/15/2017 (Exact Date)    SpO2 95%    BMI 27.99 kg/m2     General: No acute distress. Eyes: Sclera anicteric. ENT: No oral lesions. Thyroid normal.  Nodes: No adenopathy. Skin: No spider angiomata. No jaundice. No palmar erythema. Respiratory: Lungs clear to auscultation. Cardiovascular: Regular heart rate. No murmurs. No JVD. Abdomen: Soft non-tender. Liver size normal to percussion/palpation. Spleen not palpable. No obvious ascites. Extremities: No edema. No muscle wasting. No gross arthritic changes. Neurologic: Alert and oriented. Cranial nerves grossly intact. No asterixis. LABORATORY STUDIES:  Liver [facility] Ref Rng & Units 9/1/2017 8/29/2017 3/4/2016   WBC 3.4 - 10.8 x10E3/uL 5.0 3.7 5.9   ANC 1.4 - 7.0 x10E3/uL 2.0 1.9    HGB 11.1 - 15.9 g/dL 11.9 11.2 12.8    - 379 x10E3/uL 277 250 461 (H)   INR 0.8 - 1.2 1.0     AST 0 - 40 IU/L 186 (H) 505 (HH) 26   ALT 0 - 32 IU/L 453 (H) 630 (HH) 15   Alk Phos 39 - 117 IU/L 456 (H) 480 (H) 77   Bili, Total 0.0 - 1.2 mg/dL 2.3 (H) 3.5 (H) 0.4   Bili, Direct 0.00 - 0.40 mg/dL 1.40 (H) 2.68 (H)    Albumin 3.5 - 5.5 g/dL 4.1 4.1 4.1   BUN 6 - 20 mg/dL 10 7 6   Creat 0.57 - 1.00 mg/dL 0.70 0.64 0.68   Na 134 - 144 mmol/L 139 139 139   K 3.5 - 5.2 mmol/L 4.2 4.2 4.5   Cl 96 - 106 mmol/L 99 98 99   CO2 18 - 29 mmol/L 24 26 25   Glucose 65 - 99 mg/dL 89 82 87     SEROLOGIES:  Not available or performed. Testing was performed today. LIVER HISTOLOGY:  Not available or performed    ENDOSCOPIC PROCEDURES:  Not available or performed    RADIOLOGY:  8/2017. Ultrasound of liver. Echogenic consistent with chronic liver disease. No liver mass lesions. No dilated bile ducts. No ascites. OTHER TESTING:  Not available or performed    ASSESSMENT AND PLAN:  Persistent elevation in liver transaminases and alkaline phosphate of unclear etiology at this time. The TBILI is marginally elevated. Liver function is normal.  The platelet count is normal.      Based upon laboratory studies and imaging the patient does not appear to have advanced liver disease     Serologic testing to help define the cause of the laboratory abnormality were ordered. The most likely causes for the liver chemistry abnormalities were discussed with the patient and include immune liver disorders,     Will perform laboratory testing to monitor liver function and degree of liver injury. This included BMP, hepatic panel, CBC with platelet count, INR. May need to perform a liver biopsy to help determine the cause and severity of the liver test abnormalities was discussed. The risks of performing the liver biopsy including pain, puncture of the lung, gallbladder, intestine or kidney and bleeding were discussed. Will defer liver biopsy for now. May need to perform imaging of the liver with MRI and MRCP. This will depend upon the results of the serologic testing. The patient was directed to continue all current medications at the current dosages. There are no contraindications for the patient to take any medications that are necessary for treatment of other medical issues. The patient was counseled regarding alcohol consumption. The need for vaccination against viral hepatitis A and B will be assessed with serologic and instituted as appropriate. All of the above issues were discussed with the patient.   All questions were answered. The patient expressed a clear understanding of the above. [address] [address] in 1 week to review all data and determine the treatment plan.     Barb Shabazz MD  L[facility] [place] [address] [facility] [address] [facility] [address].  [phone]

## 2017-09-02 LAB
ALBUMIN SERPL-MCNC: 4.1 G/DL (ref 3.5–5.5)
ALP SERPL-CCNC: 456 IU/L (ref 39–117)
ALT SERPL-CCNC: 453 IU/L (ref 0–32)
AST SERPL-CCNC: 186 IU/L (ref 0–40)
BASOPHILS # BLD AUTO: 0 X10E3/UL (ref 0–0.2)
BASOPHILS NFR BLD AUTO: 0 %
BILIRUB DIRECT SERPL-MCNC: 1.4 MG/DL (ref 0–0.4)
BILIRUB SERPL-MCNC: 2.3 MG/DL (ref 0–1.2)
BUN SERPL-MCNC: 10 MG/DL (ref 6–20)
BUN/CREAT SERPL: 14 (ref 9–23)
CALCIUM SERPL-MCNC: 9.4 MG/DL (ref 8.7–10.2)
CERULOPLASMIN SERPL-MCNC: 29.8 MG/DL (ref 19–39)
CHLORIDE SERPL-SCNC: 99 MMOL/L (ref 96–106)
CO2 SERPL-SCNC: 24 MMOL/L (ref 18–29)
CREAT SERPL-MCNC: 0.7 MG/DL (ref 0.57–1)
EOSINOPHIL # BLD AUTO: 0.1 X10E3/UL (ref 0–0.4)
EOSINOPHIL NFR BLD AUTO: 2 %
ERYTHROCYTE [DISTWIDTH] IN BLOOD BY AUTOMATED COUNT: 13.8 % (ref 12.3–15.4)
GLUCOSE SERPL-MCNC: 89 MG/DL (ref 65–99)
HAV AB SER QL IA: NEGATIVE
HBV CORE AB SERPL QL IA: NEGATIVE
HBV SURFACE AB SER QL: REACTIVE
HCT VFR BLD AUTO: 36.3 % (ref 34–46.6)
HGB BLD-MCNC: 11.9 G/DL (ref 11.1–15.9)
IMM GRANULOCYTES # BLD: 0 X10E3/UL (ref 0–0.1)
IMM GRANULOCYTES NFR BLD: 0 %
INR PPP: 1 (ref 0.8–1.2)
LYMPHOCYTES # BLD AUTO: 2.4 X10E3/UL (ref 0.7–3.1)
LYMPHOCYTES NFR BLD AUTO: 50 %
MCH RBC QN AUTO: 31.4 PG (ref 26.6–33)
MCHC RBC AUTO-ENTMCNC: 32.8 G/DL (ref 31.5–35.7)
MCV RBC AUTO: 96 FL (ref 79–97)
MONOCYTES # BLD AUTO: 0.4 X10E3/UL (ref 0.1–0.9)
MONOCYTES NFR BLD AUTO: 7 %
NEUTROPHILS # BLD AUTO: 2 X10E3/UL (ref 1.4–7)
NEUTROPHILS NFR BLD AUTO: 41 %
PLATELET # BLD AUTO: 277 X10E3/UL (ref 150–379)
POTASSIUM SERPL-SCNC: 4.2 MMOL/L (ref 3.5–5.2)
PROT SERPL-MCNC: 7 G/DL (ref 6–8.5)
PROTHROMBIN TIME: 10.4 SEC (ref 9.1–12)
RBC # BLD AUTO: 3.79 X10E6/UL (ref 3.77–5.28)
SODIUM SERPL-SCNC: 139 MMOL/L (ref 134–144)
WBC # BLD AUTO: 5 X10E3/UL (ref 3.4–10.8)

## 2017-09-03 LAB
ACTIN IGG SERPL-ACNC: 20 UNITS (ref 0–19)
C TRACH RRNA CVX QL NAA+PROBE: NEGATIVE
CYTOLOGIST CVX/VAG CYTO: NORMAL
CYTOLOGY CVX/VAG DOC THIN PREP: NORMAL
CYTOLOGY HISTORY:: NORMAL
DX ICD CODE: NORMAL
LABCORP, 190119: NORMAL
Lab: NORMAL
MITOCHONDRIA M2 IGG SER-ACNC: 5 UNITS (ref 0–20)
N GONORRHOEA RRNA CVX QL NAA+PROBE: NEGATIVE
OTHER STN SPEC: NORMAL
PATH REPORT.FINAL DX SPEC: NORMAL
STAT OF ADQ CVX/VAG CYTO-IMP: NORMAL
T VAGINALIS RRNA SPEC QL NAA+PROBE: NEGATIVE

## 2017-09-04 LAB
C-ANCA TITR SER IF: NORMAL TITER
P-ANCA ATYPICAL TITR SER IF: NORMAL TITER
P-ANCA TITR SER IF: NORMAL TITER

## 2017-09-05 LAB
ANA HOMOGEN TITR SER: NORMAL {TITER}
ANA SPECKLED TITR SER: NORMAL {TITER}
ANA TITR SER IF: POSITIVE {TITER}
LKM-1 AB SER-ACNC: 2.4 UNITS (ref 0–20)
Lab: NORMAL

## 2017-09-13 ENCOUNTER — TELEPHONE (OUTPATIENT)
Dept: HEMATOLOGY | Age: 27
End: 2017-09-13

## 2017-09-13 NOTE — TELEPHONE ENCOUNTER
Patient left message  concerning missed appointment yesterday 9/12/17. Attempted to call patient  to reschedule, had to leave voice mail. Patient left a alternate contact number, but did not state if it was her home phone, cell, etc.  Left message on alternate number [phone]. Voice mail not available for phone number listed in [facility].

## 2017-09-18 ENCOUNTER — TELEPHONE (OUTPATIENT)
Dept: HEMATOLOGY | Age: 27
End: 2017-09-18

## 2017-09-18 NOTE — TELEPHONE ENCOUNTER
Contacted patient to schedule follow up appointment with Dr. Jeffrey Geller. Patient was a no show on 09/13/17. Patient stated her availability was Tues and Fri before 11am, and Thursday all day. Expressed to patient the importance of the follow up with Dr. Jeffrey Geller.    Next appointment is Thursday 09/28/17 @ 11:30am.

## 2017-09-21 ENCOUNTER — TELEPHONE (OUTPATIENT)
Dept: SURGERY | Age: 27
End: 2017-09-21

## 2017-09-28 ENCOUNTER — OFFICE VISIT (OUTPATIENT)
Dept: HEMATOLOGY | Age: 27
End: 2017-09-28

## 2017-09-28 VITALS
HEART RATE: 67 BPM | SYSTOLIC BLOOD PRESSURE: 115 MMHG | TEMPERATURE: 98.5 F | HEIGHT: 66 IN | WEIGHT: 177.8 LBS | DIASTOLIC BLOOD PRESSURE: 87 MMHG | OXYGEN SATURATION: 98 % | BODY MASS INDEX: 28.57 KG/M2

## 2017-09-28 DIAGNOSIS — R74.8 ELEVATED LIVER ENZYMES: Primary | ICD-10-CM

## 2017-09-28 NOTE — PROGRESS NOTES
[address] Javier, [place], [place], [place]       CHICHI Kitchen PA-C Evander Hays, [facility]   CHICHI Bell NP        at [address]     [address] [place] [address], [address] [address]  22.     [phone]     FAX: [phone]    at [facility], [address] [place] [address], [address]     [phone]     FAX: [phone]       Patient Care Team:  Willian Schmidt NP as PCP - General (Nurse Practitioner)  General Cassidy MD (Obstetrics & Gynecology)      Problem List  Date Reviewed: 9/30/2017          Codes Class Noted    Elevated liver enzymes ICD-10-CM: R74.8  ICD-9-CM: 790.5  9/30/2017        Hypothyroidism ICD-10-CM: E03.9  ICD-9-CM: 244.9  9/1/2017        Status post laparoscopic sleeve gastrectomy ICD-10-CM: Z98.84  ICD-9-CM: V45.86  10/17/2014    Overview Signed 10/17/2014  2:19 PM by Greg Carlton NP     November 2013             PCO (polycystic ovaries) ICD-10-CM: E28.2  ICD-9-CM: 256.4  7/12/2011    Overview Signed 1/30/2013 12:09 PM by Juan Cook NP     Diagnosed during teenage years. Dallas Richey returns to the [place] [address] for management of elevated liver enzymes. The active problem list, all pertinent past medical history, medications, radiologic findings and laboratory findings related to the liver disorder were reviewed with the patient. The patient is a 23 y.o. Black female who was first noted to have abnormalities in liver transaminases and alkaline phosphate in 8/2017. Serologic evaluation for markers of chronic liver disease were positive for NABILA, ASMA. Ultrasound of the liver was performed in 8/2017. The results of the imaging suggested chronic liver disease or fatty liver disease. An assessment of liver fibrosis with biopsy or elastography has not been performed.       The most recent laboratory studies indicate that the liver transaminases are in the 600-800 range. ALP is in the 400 range, total bilirubin is mildly elevated in the 203 range, albumin is  normal, and the platelet count is normal.    The patient notes yellowing of the eyes or skin,     The patient completes all daily activities without any functional limitations. The patient has not experienced fatigue, pain in the right side over the liver, arthralgias, myalgias,     ALLERGIES  No Known Allergies    MEDICATIONS  Current Outpatient Prescriptions   Medication Sig    calcium-cholecalciferol, d3, (CALCIUM 600 + D) 600-125 mg-unit tab Take  by mouth.  CYANOCOBALAMIN, VITAMIN B-12, (VITAMIN B-12 PO) Take  by mouth.  multivitamin (ONE A DAY) tablet Take 2 Tabs by mouth daily.  biotin 500 mcg cap Take 1,000 mcg by mouth daily.  levonorgestrel (MIRENA) 20 mcg/24 hr IUD 1 Each by IntraUTERine route once. No current facility-administered medications for this visit. SYSTEM REVIEW NOT RELATED TO LIVER DISEASE OR REVIEWED ABOVE:  Constitution systems: Negative for fever, chills, weight gain, weight loss. Eyes: Negative for visual changes. ENT: Negative for sore throat, painful swallowing. Respiratory: Negative for cough, hemoptysis, SOB. Cardiology: Negative for chest pain, palpitations. GI:  Negative for constipation or diarrhea. : Negative for urinary frequency, dysuria, hematuria, nocturia. Skin: Negative for rash. Hematology: Negative for easy bruising, blood clots. Musculo-skelatal: Negative for back pain, muscle pain, weakness. Neurologic: Negative for headaches, dizziness, vertigo, memory problems not related to HE. Psychology: Negative for anxiety, depression. FAMILY HISTORY:  The patient has no knowledge of the father's medical condition. The mother has thyroid disease and arthritis. There is no family history of liver disease.       SOCIAL HISTORY:  The patient has never been . The patient has no children. The patient has never used tobacco products. The patient consumes alcohol on social occasions never in excess. The patient currently works and takes courses on line. She is in her rubi year. She works full time for [employer] in the [employer] center. PHYSICAL EXAMINATION:  Visit Vitals    /87 (BP 1 Location: Left arm, BP Patient Position: Sitting)    Pulse 67    Temp 98.5 °F (36.9 °C) (Tympanic)    Ht 5' 6\" (1.676 m)    Wt 177 lb 12.8 oz (80.6 kg)    LMP 08/15/2017 (Exact Date)    SpO2 98%    BMI 28.7 kg/m2     General: No acute distress. Eyes: Sclera anicteric. ENT: No oral lesions. Thyroid normal.  Nodes: No adenopathy. Skin: No spider angiomata. No jaundice. No palmar erythema. Respiratory: Lungs clear to auscultation. Cardiovascular: Regular heart rate. No murmurs. No JVD. Abdomen: Soft non-tender. Liver size normal to percussion/palpation. Spleen not palpable. No obvious ascites. Extremities: No edema. No muscle wasting. No gross arthritic changes. Neurologic: Alert and oriented. Cranial nerves grossly intact. No asterixis.     LABORATORY STUDIES:  Liver [place] of [address] [place] [address] & Units 9/1/2017 8/29/2017 3/4/2016   WBC 3.4 - 10.8 x10E3/uL 5.0 3.7 5.9   ANC 1.4 - 7.0 x10E3/uL 2.0 1.9    HGB 11.1 - 15.9 g/dL 11.9 11.2 12.8    - 379 x10E3/uL 277 250 461 (H)   INR 0.8 - 1.2 1.0     AST 0 - 40 IU/L 186 (H) 505 (HH) 26   ALT 0 - 32 IU/L 453 (H) 630 (HH) 15   Alk Phos 39 - 117 IU/L 456 (H) 480 (H) 77   Bili, Total 0.0 - 1.2 mg/dL 2.3 (H) 3.5 (H) 0.4   Bili, Direct 0.00 - 0.40 mg/dL 1.40 (H) 2.68 (H)    Albumin 3.5 - 5.5 g/dL 4.1 4.1 4.1   BUN 6 - 20 mg/dL 10 7 6   Creat 0.57 - 1.00 mg/dL 0.70 0.64 0.68   Na 134 - 144 mmol/L 139 139 139   K 3.5 - 5.2 mmol/L 4.2 4.2 4.5   Cl 96 - 106 mmol/L 99 98 99   CO2 18 - 29 mmol/L 24 26 25   Glucose 65 - 99 mg/dL 89 82 87     SEROLOGIES:  Serologies Latest Ref Rng & Units 10/14/2015   Hep B Surface Ag Negative Negative   Hep C Ab 0.0 - 0.9 s/co ratio <0.1     Serologies Latest Ref Rng & Units 9/1/2017   Hep A Ab, Total Negative Negative   Hep B Core Ab, Total Negative Negative   Hep B Surface AB QL  Reactive   NABILA, IFA  Positive (A)   NABILA Pattern  1:80   NABILA Pattern  1:80   C-ANCA Neg:<1:20 titer <1:20   P-ANCA Neg:<1:20 titer <1:20   ANCA Neg:<1:20 titer <1:20   ASMCA 0 - 19 Units 20 (H)   M2 Ab 0.0 - 20.0 Units 5.0   LKM 0.0 - 20.0 Units 2.4   Ceruloplasmin 19.0 - 39.0 mg/dL 29.8     LIVER HISTOLOGY:  Not available or performed    ENDOSCOPIC PROCEDURES:  Not available or performed    RADIOLOGY:  8/2017. Ultrasound of liver. Echogenic consistent with chronic liver disease. No liver mass lesions. No dilated bile ducts. No ascites. OTHER TESTING:  Not available or performed    ASSESSMENT AND PLAN:  Persistent elevation in liver transaminases and alkaline phosphate of unclear etiology at this time. The TBILI is marginally elevated. Other Liver function is normal.  The platelet count is normal.      Based upon laboratory studies and imaging the patient does not appear to have advanced liver disease     The most likely causes for the liver chemistry abnormalities were discussed with the patient and include immune liver disorders,     The need to perform a liver biopsy to help determine the cause and severity of the liver test abnormalities was discussed. The risks of performing the liver biopsy including pain, puncture of the lung, gallbladder, intestine or kidney and bleeding were discussed. The patient has decided to have a liver biopsy. This will be scheduled. Will perform imaging of the liver with MRI and MRCP. The patient was directed to continue all current medications at the current dosages. There are no contraindications for the patient to take any medications that are necessary for treatment of other medical issues.     The patient was counseled regarding alcohol consumption. Vaccination for viral hepatitis A is recommended since the patient has no serologic evidence of previous exposure or vaccination with immunity. Vaccination for viral hepatitis B is not needed. The patient has serologic evidence of prior exposure or vaccination with immunity. All of the above issues were discussed with the patient. All questions were answered. The patient expressed a clear understanding of the above. [facility] 87 in 2 weeks after liver biopsy.     Paddy Badillo MD  Liver [place] [address] 22.  [phone]

## 2017-09-28 NOTE — MR AVS SNAPSHOT
Visit Information Date & Time Provider Department Dept. Phone Encounter #  
 9/28/2017 11:30 AM Nathan Garcia. [address] of [facility] 219 [id number] Follow-up Instructions Return for 2 weeks after LBX. Your Appointments 10/5/2017 11:15 AM  
PROCEDURE with MD Donato Garcia 75 [address]) Appt Note: [address] [phone] [facility] [address]  
[address]  
  
    
 10/12/2017  4:05 PM  
Follow Up with MD Donato Garcia 75 ([address]) Appt Note: LBX Follow up [address] [phone] [facility] [address]  
[address] Upcoming Health Maintenance Date Due  
 FOOT EXAM Q1 12/18/2001 MICROALBUMIN Q1 12/18/2001 EYE EXAM RETINAL OR DILATED Q1 12/18/2001 Pneumococcal 19-64 Medium Risk (1 of 1 - PPSV23) 12/18/2010 LIPID PANEL Q1 11/5/2014 HEMOGLOBIN A1C Q6M 2/7/2016 INFLUENZA AGE 9 TO ADULT 8/1/2017 PAP AKA CERVICAL CYTOLOGY 8/29/2020 DTaP/Tdap/Td series (7 - Td) 2/15/2023 Allergies as of 9/28/2017  Review Complete On: 9/28/2017 By: Suha Dean LPN No Known Allergies Current Immunizations  Reviewed on 8/7/2015 Name Date DTAP Vaccine 1/3/1996, 6/23/1994, 7/7/1992, 5/7/1992, 3/7/1992 Hepatitis B Vaccine 4/27/2004, 1/30/2004, 12/9/2003, 8/4/1996, 2/26/1996, 1/3/1996 Human Papillomavirus 10/24/2007, 11/28/2006 IPV 7/3/1996, 1/3/1996, 5/7/1992, 3/7/1992 MMR Vaccine 1/3/1996, 2/18/1993 Meningococcal Vaccine 7/14/2011 TDAP Vaccine 7/14/2011 Tdap 2/15/2013 12:11 AM  
  
 Not reviewed this visit You Were Diagnosed With   
  
 Codes Comments Elevated liver enzymes    -  Primary ICD-10-CM: R74.8 ICD-9-CM: 790.5 Vitals BP Pulse Temp Height(growth percentile) Weight(growth percentile) 115/87 (BP 1 Location: Left arm, BP Patient Position: Sitting) 67 98.5 °F (36.9 °C) (Tympanic) 5' 6\" (1.676 m) 177 lb 12.8 oz (80.6 kg) LMP SpO2 BMI OB Status Smoking Status 08/15/2017 (Exact Date) 98% 28.7 kg/m2 Having regular periods Never Smoker BMI and BSA Data Body Mass Index Body Surface Area 28.7 kg/m 2 1.94 m 2 Preferred Pharmacy Pharmacy Name Phone [facility] [address] [place] [address], [address] [place] [address] [phone] Your Updated Medication List  
  
   
This list is accurate as of: 9/28/17 12:19 PM.  Always use your most recent med list.  
  
  
  
  
 biotin 500 mcg Cap Take 1,000 mcg by mouth daily. CALCIUM 600 + D 600-125 mg-unit Tab Generic drug:  calcium-cholecalciferol (d3) Take  by mouth. MIRENA 20 mcg/24 hr (5 years) IUD Generic drug:  levonorgestrel 1 Each by IntraUTERine route once. multivitamin tablet Commonly known as:  ONE A DAY Take 2 Tabs by mouth daily. VITAMIN B-12 PO Take  by mouth. We Performed the Following CBC WITH AUTOMATED DIFF [83062 CPT(R)] HEPATIC FUNCTION PANEL [98360 CPT(R)] METABOLIC PANEL, BASIC [42068 CPT(R)] PROTHROMBIN TIME + INR [67732 CPT(R)] Follow-up Instructions Return for 2 weeks after LBX. To-Do List   
 10/28/2017 Procedures:  BIOPSY LIVER   
  
 10/28/2017 Imaging:  MRI ABD W MRCP W WO CONT Introducing [facility]! Dear Celia Levy: Thank you for requesting a [employer] account. Our records indicate that you already have an active [employer] account. You can access your account anytime at [email or web address] Did you know that you can access your hospital and ER discharge instructions at any time in Aentropico? You can also review all of your test results from your hospital stay or ER visit. Additional Information If you have questions, please visit the Frequently Asked Questions section of the [email or web address] website at [email or web address] Remember, [employer] is NOT to be used for urgent needs. For medical emergencies, dial 911. Now available from your iPhone and Android! Please provide this summary of care documentation to your next provider. Your primary care clinician is listed as Naz Lizama. If you have any questions after today's visit, please call [phone].

## 2017-09-29 LAB
ALBUMIN SERPL-MCNC: 3.8 G/DL (ref 3.5–5.5)
ALP SERPL-CCNC: 96 IU/L (ref 39–117)
ALT SERPL-CCNC: 6 IU/L (ref 0–32)
AST SERPL-CCNC: 12 IU/L (ref 0–40)
BASOPHILS # BLD AUTO: 0 X10E3/UL (ref 0–0.2)
BASOPHILS NFR BLD AUTO: 1 %
BILIRUB DIRECT SERPL-MCNC: 0.34 MG/DL (ref 0–0.4)
BILIRUB SERPL-MCNC: 0.6 MG/DL (ref 0–1.2)
BUN SERPL-MCNC: 8 MG/DL (ref 6–20)
BUN/CREAT SERPL: 11 (ref 9–23)
CALCIUM SERPL-MCNC: 8.8 MG/DL (ref 8.7–10.2)
CHLORIDE SERPL-SCNC: 102 MMOL/L (ref 96–106)
CO2 SERPL-SCNC: 27 MMOL/L (ref 18–29)
CREAT SERPL-MCNC: 0.73 MG/DL (ref 0.57–1)
EOSINOPHIL # BLD AUTO: 0.1 X10E3/UL (ref 0–0.4)
EOSINOPHIL NFR BLD AUTO: 2 %
ERYTHROCYTE [DISTWIDTH] IN BLOOD BY AUTOMATED COUNT: 13.6 % (ref 12.3–15.4)
GLUCOSE SERPL-MCNC: 84 MG/DL (ref 65–99)
HCT VFR BLD AUTO: 35.5 % (ref 34–46.6)
HGB BLD-MCNC: 11.8 G/DL (ref 11.1–15.9)
IMM GRANULOCYTES # BLD: 0 X10E3/UL (ref 0–0.1)
IMM GRANULOCYTES NFR BLD: 0 %
INR PPP: 1.1 (ref 0.8–1.2)
LYMPHOCYTES # BLD AUTO: 1.7 X10E3/UL (ref 0.7–3.1)
LYMPHOCYTES NFR BLD AUTO: 46 %
MCH RBC QN AUTO: 31.3 PG (ref 26.6–33)
MCHC RBC AUTO-ENTMCNC: 33.2 G/DL (ref 31.5–35.7)
MCV RBC AUTO: 94 FL (ref 79–97)
MONOCYTES # BLD AUTO: 0.5 X10E3/UL (ref 0.1–0.9)
MONOCYTES NFR BLD AUTO: 12 %
NEUTROPHILS # BLD AUTO: 1.4 X10E3/UL (ref 1.4–7)
NEUTROPHILS NFR BLD AUTO: 39 %
PLATELET # BLD AUTO: 195 X10E3/UL (ref 150–379)
POTASSIUM SERPL-SCNC: 4.2 MMOL/L (ref 3.5–5.2)
PROT SERPL-MCNC: 6.4 G/DL (ref 6–8.5)
PROTHROMBIN TIME: 11.2 SEC (ref 9.1–12)
RBC # BLD AUTO: 3.77 X10E6/UL (ref 3.77–5.28)
SODIUM SERPL-SCNC: 141 MMOL/L (ref 134–144)
WBC # BLD AUTO: 3.7 X10E3/UL (ref 3.4–10.8)

## 2017-09-30 PROBLEM — R74.8 ELEVATED LIVER ENZYMES: Status: ACTIVE | Noted: 2017-09-30

## 2017-11-02 ENCOUNTER — APPOINTMENT (OUTPATIENT)
Dept: ULTRASOUND IMAGING | Age: 27
End: 2017-11-02
Attending: INTERNAL MEDICINE
Payer: COMMERCIAL

## 2017-11-02 ENCOUNTER — HOSPITAL ENCOUNTER (OUTPATIENT)
Age: 27
Setting detail: OUTPATIENT SURGERY
Discharge: HOME | End: 2017-11-02
Attending: INTERNAL MEDICINE | Admitting: INTERNAL MEDICINE
Payer: COMMERCIAL

## 2017-11-02 VITALS
RESPIRATION RATE: 14 BRPM | DIASTOLIC BLOOD PRESSURE: 78 MMHG | WEIGHT: 170 LBS | SYSTOLIC BLOOD PRESSURE: 114 MMHG | TEMPERATURE: 98 F | HEIGHT: 65 IN | HEART RATE: 65 BPM | OXYGEN SATURATION: 100 % | BODY MASS INDEX: 28.32 KG/M2

## 2017-11-02 DIAGNOSIS — R74.8 ACID PHOSPHATASE ELEVATED: ICD-10-CM

## 2017-11-02 LAB — HCG UR QL: NEGATIVE

## 2017-11-02 PROCEDURE — 88307 TISSUE EXAM BY PATHOLOGIST: CPT | Performed by: INTERNAL MEDICINE

## 2017-11-02 PROCEDURE — 76040000019: Performed by: INTERNAL MEDICINE

## 2017-11-02 PROCEDURE — 77030013826 HC NDL BIOP MAXCOR BARD -B: Performed by: INTERNAL MEDICINE

## 2017-11-02 PROCEDURE — 88313 SPECIAL STAINS GROUP 2: CPT | Performed by: INTERNAL MEDICINE

## 2017-11-02 PROCEDURE — 81025 URINE PREGNANCY TEST: CPT

## 2017-11-02 PROCEDURE — 74011250636 HC RX REV CODE- 250/636: Performed by: INTERNAL MEDICINE

## 2017-11-02 PROCEDURE — 76942 ECHO GUIDE FOR BIOPSY: CPT

## 2017-11-02 RX ORDER — SODIUM CHLORIDE 0.9 % (FLUSH) 0.9 %
5-10 SYRINGE (ML) INJECTION AS NEEDED
Status: DISCONTINUED | OUTPATIENT
Start: 2017-11-02 | End: 2017-11-02 | Stop reason: HOSPADM

## 2017-11-02 RX ORDER — HYDROMORPHONE HYDROCHLORIDE 1 MG/ML
1 INJECTION, SOLUTION INTRAMUSCULAR; INTRAVENOUS; SUBCUTANEOUS
Status: DISCONTINUED | OUTPATIENT
Start: 2017-11-02 | End: 2017-11-02 | Stop reason: HOSPADM

## 2017-11-02 RX ORDER — ONDANSETRON 2 MG/ML
4 INJECTION INTRAMUSCULAR; INTRAVENOUS
Status: DISCONTINUED | OUTPATIENT
Start: 2017-11-02 | End: 2017-11-02 | Stop reason: HOSPADM

## 2017-11-02 RX ORDER — SODIUM CHLORIDE 0.9 % (FLUSH) 0.9 %
5-10 SYRINGE (ML) INJECTION EVERY 8 HOURS
Status: DISCONTINUED | OUTPATIENT
Start: 2017-11-02 | End: 2017-11-02 | Stop reason: HOSPADM

## 2017-11-02 RX ADMIN — HYDROMORPHONE HYDROCHLORIDE 0.5 MG: 1 INJECTION, SOLUTION INTRAMUSCULAR; INTRAVENOUS; SUBCUTANEOUS at 12:27

## 2017-11-02 NOTE — DISCHARGE INSTRUCTIONS
[address] Javier, [address], [facility], [place]       CHICHI Verma PA-C    [facility]   CHICHI Delgadillo NP        at [address], [address] [address].     [phone]     FAX: [phone]    at [address], [address] [facility], [address]     [phone]     FAX: [phone]       LIVER BIOPSY DISCHARGE INSTRUCTIONS      Anayeli Ward  1990  Date: 11/2/2017    DIET:    Whitley Lim may resume your previous diet. ACTIVITIES:  Rest quietly the rest of today. You should not lift any objects more than 20 pounds for the next 2 days. If you work sitting down without strenuous activity you may return to work tomorrow. If you exert yourself or do heavy lifting at work you should take tomorrow off. Do not drive or operate hazardous machinery for 12 hours. SPECIAL INSTRUCTIONS:  Do not use any aspirin or non-steroidal (Motin, Advil, Naproxen, etc) pain medications for the next 3 days. You may use extra-strength Tylenol (acetaminophen) if you experience pain or discomfort later today. Call the [employer] [address] [place] [address] office if you experience any of the following:  Persistent or severe abdominal pain. Persistent or severe abdominal distention. Fever and chills   Nausea and vomiting. New or unusual symptoms. Follow-up care: You should have a follow up appointment with Dr. Rodney Bauer to review the results of the liver biopsy results in 2 weeks. If you do not have an appointment please call the office at the number listed above to schedule this. Other instructions: If you have any problems or questions call the [facility] office at the phone number listed above. DISCHARGE SUMMARY from Nurse:     The following personal items collected during your admission are returned to you: Dental Appliance: Dental Appliances: None  Vision:    Hearing Aid:    Jewelry:    Clothing:    Other Valuables:    Valuables sent to safe:

## 2017-11-02 NOTE — H&P
[address] MD, [address], [place], [place]       CHICHI Ron PA-C Jud Alstrom, VERA-BC   Zakiya Age, NP    Melissa Avalos NP        at [facility] AT [address], [address] [address].     [phone]     FAX: [phone]    at [address], [address] [place] [address], [address]     [phone]     FAX: [phone]       PRE-PROCEDURE NOTE - LIVER BIOPSY    H and P from last office visit reviewed. Allergies reviewed. Out-patient medication list reviewed. Patient Active Problem List   Diagnosis Code    PCO (polycystic ovaries) E28.2    Status post laparoscopic sleeve gastrectomy Z98.84    Hypothyroidism E03.9    Elevated liver enzymes R74.8       No Known Allergies    No current facility-administered medications on file prior to encounter. Current Outpatient Prescriptions on File Prior to Encounter   Medication Sig Dispense Refill    calcium-cholecalciferol, d3, (CALCIUM 600 + D) 600-125 mg-unit tab Take  by mouth.  CYANOCOBALAMIN, VITAMIN B-12, (VITAMIN B-12 PO) Take  by mouth.  multivitamin (ONE A DAY) tablet Take 2 Tabs by mouth daily.  biotin 500 mcg cap Take 1,000 mcg by mouth daily.  levonorgestrel (MIRENA) 20 mcg/24 hr IUD 1 Each by IntraUTERine route once. For liver biopsy to assess Abnormal liver enzymes. The risks of the procedure were discussed with the patient. This included bleeding, pain, and puncture of other organs. All questions were answered. The patient wishes to proceed with the procedure. PHYSICAL EXAMINATION:  VS: per nursing note  General: No acute distress. Eyes: Sclera anicteric. ENT: No oral lesions. Thyroid normal.  Nodes: No adenopathy. Skin: No spider angiomata. No jaundice. No palmar erythema. Respiratory: Lungs clear to auscultation.    Cardiovascular: Regular heart rate. No murmurs. No JVD. Abdomen: Soft non-tender, liver size normal to percussion/palpation. Spleen not palpable. No obvious ascites. Extremities: No edema. No muscle wasting. No gross arthritic changes. Neurologic: Alert and oriented. Cranial nerves grossly intact. No asterixis. LABS:  Lab Results   Component Value Date/Time    WBC 3.7 09/28/2017 12:00 PM    HGB 11.8 09/28/2017 12:00 PM    HCT 35.5 09/28/2017 12:00 PM    PLATELET 319 22/46/6101 12:00 PM    MCV 94 09/28/2017 12:00 PM     Lab Results   Component Value Date/Time    INR 1.1 09/28/2017 12:00 PM    INR 1.0 09/01/2017 09:34 AM    Prothrombin time 11.2 09/28/2017 12:00 PM    Prothrombin time 10.4 09/01/2017 09:34 AM       ASSESSMENT AND PLAN:  Liver biopsy under ultrasound guidance.     Zandra Klein MD  Liver [place] [address] 22.  [phone]

## 2017-11-02 NOTE — IP AVS SNAPSHOT
[address] 
[phone] Patient: Ki De Dios MRN: [id number] :1990 About your hospitalization You were admitted on:  2017 You last received care in the:  [facility] ENDOSCOPY You were discharged on:  2017 Why you were hospitalized Your primary diagnosis was:  Not on File Things You Need To Do (next 8 weeks) Follow up with Alessandro Limon NP Phone:  [phone] Where:  [address], [address], 7[facility] [address] Discharge Orders None A check ragini indicates which time of day the medication should be taken. My Medications TAKE these medications as instructed Instructions Each Dose to Equal  
 Morning Noon Evening Bedtime  
 biotin 500 mcg Cap Your last dose was: Your next dose is: Take 1,000 mcg by mouth daily. 1000 mcg CALCIUM 600 + D 600-125 mg-unit Tab Generic drug:  calcium-cholecalciferol (d3) Your last dose was: Your next dose is: Take  by mouth. MIRENA 20 mcg/24 hr (5 years) IUD Generic drug:  levonorgestrel Your last dose was: Your next dose is:    
   
   
 1 Each by IntraUTERine route once. 1 Each  
    
   
   
   
  
 multivitamin tablet Commonly known as:  ONE A DAY Your last dose was: Your next dose is: Take 2 Tabs by mouth daily. 2 Tab  
    
   
   
   
  
 VITAMIN B-12 PO Your last dose was: Your next dose is: Take  by mouth. Discharge Instructions [facility] Melo Mcgovern MD, GREGORY Carson NP Zandra Glisson, PA-C Suan Goes, ACNP-CHICHI Smith NP  
 
 at [place] [address], [address] Radha [address]  22. 
   [phone] FAX: [phone]    at [address], [address] 
   [phone] FAX: [phone] LIVER BIOPSY DISCHARGE INSTRUCTIONS Dianna Magdaleno 1990 Date: 11/2/2017 DIET:   
You may resume your previous diet. ACTIVITIES: 
Rest quietly the rest of today. You should not lift any objects more than 20 pounds for the next 2 days. If you work sitting down without strenuous activity you may return to work tomorrow. If you exert yourself or do heavy lifting at work you should take tomorrow off. Do not drive or operate hazardous machinery for 12 hours. SPECIAL INSTRUCTIONS: 
Do not use any aspirin or non-steroidal (Motin, Advil, Naproxen, etc) pain medications for the next 3 days. You may use extra-strength Tylenol (acetaminophen) if you experience pain or discomfort later today. Call the [facility] office if you experience any of the following: 
Persistent or severe abdominal pain. Persistent or severe abdominal distention. Fever and chills Nausea and vomiting. New or unusual symptoms. Follow-up care: You should have a follow up appointment with Dr. Margarito Reyez to review the results of the liver biopsy results in 2 weeks. If you do not have an appointment please call the office at the number listed above to schedule this. Other instructions: If you have any problems or questions call the [facility] office at the phone number listed above. DISCHARGE SUMMARY from Nurse: The following personal items collected during your admission are returned to you:  
Dental Appliance: Dental Appliances: None Vision:   
Hearing Aid:   
Jewelry:   
Clothing:   
Other Valuables:   
Valuables sent to safe:   
 
 
  
  
  
Introducing [facility]! Dear Nelly Green: Thank you for requesting a [email or web address] account. Our records indicate that you already have an active [email or web address] account. You can access your account anytime at [email or web address] Did you know that you can access your hospital and ER discharge instructions at any time in [email or web address]? You can also review all of your test results from your hospital stay or ER visit. Additional Information If you have questions, please visit the Frequently Asked Questions section of the [email or web address] website at [email or web address] Remember, [email or web address] is NOT to be used for urgent needs. For medical emergencies, dial 911. Now available from your iPhone and Android! Unresulted Labs-Please follow up with your PCP about these lab tests Order Current Status [facility] In process Providers Seen During Your Hospitalization Provider Specialty Primary office phone James Cronin MD Hepatology [phone] Your Primary Care Physician (PCP) Primary Care Physician Office Phone Office Fax [facility] [phone] You are allergic to the following No active allergies Recent Documentation Height Weight BMI OB Status Smoking Status 1.651 m 77.1 kg 28.29 kg/m2 Having regular periods Never Smoker Emergency Contacts Name Discharge Info Relation Home Work Mobile [address] CAREGIVER [3] Parent [1] [phone] Patient Belongings The following personal items are in your possession at time of discharge: 
  Dental Appliances: None Please provide this summary of care documentation to your next provider. Signatures-by signing, you are acknowledging that this After Visit Summary has been reviewed with you and you have received a copy.   
  
 
  
    
    
 Patient Signature: ____________________________________________________________ Date:  ____________________________________________________________  
  
Manohar Nas Provider Signature:  ____________________________________________________________ Date:  ____________________________________________________________

## 2017-11-02 NOTE — ROUTINE PROCESS
Maritza Dangelo  1990  [id number]    Situation:  Verbal report received from: Tan Siegel  Procedure: Procedure(s):  LIVER BIOPSY    Background:    Preoperative diagnosis: Acid phosphatase elevated [R74.8]  Postoperative diagnosis: 1.- Elevated Liver Enzymes    :  Dr. Skyler Mcgill  Assistant(s): Endoscopy Technician-1: Winsome Bach IV  Endoscopy RN-1: Nkechi Reynoso RN    Specimens:   ID Type Source Tests Collected by Time Destination   1 : Liver Biopsy Preservative   Harper Najera MD 11/2/2017 1150 Pathology     H. Pylori  no    Assessment:  Intra-procedure medications     Anesthesia gave intra-procedure sedation and medications, see anesthesia flow sheet yes and n/a    Intravenous fluids: NS@ KVO     Vital signs stable     Abdominal assessment: round and soft     Recommendation:  Discharge patient per MD order.     Family or Friend   Permission to share finding with family or friend yes

## 2017-11-02 NOTE — PROGRESS NOTES
[address] [facility] November 2, 2017       RE: Jose Manuel Prajapati      To Whom It May Concern,    This is to certify that Jose Manuel Prajapati was unable to attend work Thursday and Friday, 11/2/201/-11/2/2017, due to medical reasons. Please feel free to contact my office if you have any questions or concerns. Thank you for your assistance in this matter.       Sincerely,  Justen Forbes RN

## 2017-11-02 NOTE — PROCEDURES
[address] Andres Herrera MD, [address], [place], [place]       CHICHI Casas PA-C Mercer Cunas, [facility]   CHICHI Guevara NP        at [address], [address] Radha Saucedo  22.     [phone]     FAX: [phone]    at [address], [address] [place] [address] [facility] [address]     [phone]     FAX: [phone]       LIVER BIOPSY PROCEDURE NOTE    Reese Mas  1990    INDICATIONS/PRE-OPERATIVE  DIAGNOSIS:  Elevated liver enzymes    : Matt Montes MD    SEDATION: 1% Lidocaine injection 10 ml    PROCEDURE:  Informed consent to perform the procedure was obtained from the patient. The patient was positioned on the edge of the stretcher lying flat in the supine position. Ultrasound was utilized to image the liver. The diaphragm and any major mass lesion or vascular structures within the liver were identified. An appropriate site for liver biopsy was identified. The distance from the surface of the skin to the liver capsule was 3 cm. This area was prepped with betadine and draped in sterile fashion. The skin was infiltrated with 1% lidocaine. The deeper subcutanous tissues and liver capsule overlying the biopsy site were then infiltrated with 1% lidocaine until appropriate anesthesia was obtained. A small incision was made in the skin so the biopsy devise could be easily inserted. A total of 2 passes with the 18 gauge Bard biopsy devise was then made into the liver. Core(s) of liver tissue totaling 4 cm in length were obtained and placed into tissue fixative. A band aid was placed over the biopsy site. The patient was then repositioned on the right side and transported to the recovery area on the stretcher for routine monitoring until discharge.     The specimen was sent to pathology for processing via the normal transport mechanism. SPECIMEN REMOVED: Liver    ESTIMATED BLOOD LOSS: Negligible.       POST-OPERATIVE DIAGNOSIS: Same as Pre-operative Diagnosis    Pam Young MD       11/2/2017  12:18 PM

## 2017-11-14 ENCOUNTER — TELEPHONE (OUTPATIENT)
Dept: HEMATOLOGY | Age: 27
End: 2017-11-14

## 2017-11-14 NOTE — TELEPHONE ENCOUNTER
Patients LBX was rescheduled to 11/2/17. Follow up appointment was not made. Rescheduled patient for 11/24/17 with Dr. Tulio Newman. Patient had no further questions.

## 2017-11-24 ENCOUNTER — OFFICE VISIT (OUTPATIENT)
Dept: HEMATOLOGY | Age: 27
End: 2017-11-24

## 2017-11-24 VITALS
OXYGEN SATURATION: 100 % | TEMPERATURE: 98.9 F | DIASTOLIC BLOOD PRESSURE: 64 MMHG | HEART RATE: 75 BPM | WEIGHT: 185.8 LBS | BODY MASS INDEX: 30.92 KG/M2 | SYSTOLIC BLOOD PRESSURE: 108 MMHG

## 2017-11-24 DIAGNOSIS — R74.8 ELEVATED LIVER ENZYMES: Primary | ICD-10-CM

## 2017-11-24 NOTE — MR AVS SNAPSHOT
Visit Information Date & Time Provider Department Dept. Phone Encounter #  
 11/24/2017 11:15 AM Jose Donohue Isidoro Diaz [facility] 219 [id number] Follow-up Instructions Return in about 4 weeks (around 12/22/2017) for MLS. Your Appointments 12/21/2017  8:15 AM  
Follow Up with MD Donato Donohue [address] [place] [address]) 200 [facility] [phone] [place] [address] [facility] [address]  
59 [facility] [address] Upcoming Health Maintenance Date Due  
 FOOT EXAM Q1 12/18/2001 MICROALBUMIN Q1 12/18/2001 EYE EXAM RETINAL OR DILATED Q1 12/18/2001 Pneumococcal 19-64 Medium Risk (1 of 1 - PPSV23) 12/18/2010 LIPID PANEL Q1 11/5/2014 HEMOGLOBIN A1C Q6M 2/7/2016 Influenza Age 5 to Adult 8/1/2017 PAP AKA CERVICAL CYTOLOGY 8/29/2020 DTaP/Tdap/Td series (7 - Td) 2/15/2023 Allergies as of 11/24/2017  Review Complete On: 11/24/2017 By: Sugey Arcos No Known Allergies Current Immunizations  Reviewed on 8/7/2015 Name Date DTAP Vaccine 1/3/1996, 6/23/1994, 7/7/1992, 5/7/1992, 3/7/1992 Hepatitis B Vaccine 4/27/2004, 1/30/2004, 12/9/2003, 8/4/1996, 2/26/1996, 1/3/1996 Human Papillomavirus 10/24/2007, 11/28/2006 IPV 7/3/1996, 1/3/1996, 5/7/1992, 3/7/1992 MMR Vaccine 1/3/1996, 2/18/1993 Meningococcal Vaccine 7/14/2011 TDAP Vaccine 7/14/2011 Tdap 2/15/2013 12:11 AM  
  
 Not reviewed this visit You Were Diagnosed With   
  
 Codes Comments Elevated liver enzymes    -  Primary ICD-10-CM: R74.8 ICD-9-CM: 790.5 Vitals BP Pulse Temp Weight(growth percentile) LMP SpO2  
 108/64 75 98.9 °F (37.2 °C) (Tympanic) 185 lb 12.8 oz (84.3 kg) 09/01/2017 (Approximate) 100% BMI OB Status Smoking Status 30.92 kg/m2 Having regular periods Never Smoker Vitals History BMI and BSA Data Body Mass Index Body Surface Area 30.92 kg/m 2 1.97 m 2 Preferred Pharmacy Pharmacy Name Phone [facility] [address] [facility], [address] [place] [address] [phone] Your Updated Medication List  
  
   
This list is accurate as of: 11/24/17 12:09 PM.  Always use your most recent med list.  
  
  
  
  
 biotin 500 mcg Cap Take 1,000 mcg by mouth daily. CALCIUM 600 + D 600-125 mg-unit Tab Generic drug:  calcium-cholecalciferol (d3) Take  by mouth. MIRENA 20 mcg/24 hr (5 years) Iud  
Generic drug:  levonorgestrel 1 Each by IntraUTERine route once. multivitamin tablet Commonly known as:  ONE A DAY Take 2 Tabs by mouth daily. VITAMIN B-12 PO Take  by mouth. We Performed the Following HEPATIC FUNCTION PANEL [18508 CPT(R)] METABOLIC PANEL, BASIC [01948 CPT(R)] Follow-up Instructions Return in about 4 weeks (around 12/22/2017) for MLS. Introducing [facility]! Dear Reji Peña: Thank you for requesting a [employer] account. Our records indicate that you already have an active [employer] account. You can access your account anytime at [email or web address]. [email or web address] Did you know that you can access your hospital and ER discharge instructions at any time in [employer]? You can also review all of your test results from your hospital stay or ER visit. Additional Information If you have questions, please visit the Frequently Asked Questions section of the [employer] website at [email or web address] [email or web address]/. Remember, [employer] is NOT to be used for urgent needs. For medical emergencies, dial 911. Now available from your iPhone and Android! Please provide this summary of care documentation to your next provider. Your primary care clinician is listed as Prakash Boyle. If you have any questions after today's visit, please call [phone].

## 2017-11-24 NOTE — PROGRESS NOTES
[address] Andres Herrera MD, [address], [place], [place]       CHICHI Mcleod, BRIGITTE Lin, [facility]   CHICHI Arora NP        at [address], [address] [facility], [address]     [phone]     FAX: [phone]    at [address], [address]     [phone]     FAX: [phone]       Patient Care Team:  Joel Bell NP as PCP - General (Nurse Practitioner)  Carmen Yousif MD (Obstetrics & Gynecology)      Problem List  Date Reviewed: 11/2/2017          Codes Class Noted    Elevated liver enzymes ICD-10-CM: R74.8  ICD-9-CM: 790.5  9/30/2017        Hypothyroidism ICD-10-CM: E03.9  ICD-9-CM: 244.9  9/1/2017        Status post laparoscopic sleeve gastrectomy ICD-10-CM: Z98.84  ICD-9-CM: V45.86  10/17/2014    Overview Signed 10/17/2014  2:19 PM by Moon Mina NP     November 2013             PCO (polycystic ovaries) ICD-10-CM: E28.2  ICD-9-CM: 256.4  7/12/2011    Overview Signed 1/30/2013 12:09 PM by Genet Hung NP     Diagnosed during teenage years. Rebecca Smith returns to the [place] [address] for management of elevated liver enzymes. The active problem list, all pertinent past medical history, medications, radiologic findings and laboratory findings related to the liver disorder were reviewed with the patient. The patient is a 23 y.o. Black female who was first noted to have abnormalities in liver transaminases and alkaline phosphate in 8/2017. Serologic evaluation for markers of chronic liver disease were positive for NABILA, ASMA. Ultrasound of the liver was performed in 8/2017. The results of the imaging suggested chronic liver disease or fatty liver disease. The patient underwent a liver biopsy in 11/2017. The procedure was well tolerated.   I have personally reviewed the liver biopsy slides. This demonstrates normal liver. There was no fatty liver, no inflammation and no fibrosis. The liver enzymes were markedly elevated in the 200-500 range for AST, ALT and ALP when we decided to proceed with liver biopsy. The liver enzymes then returned to normal.        The patient has no symptoms which could be attributed to the liver disease. The patient completes all daily activities without any functional limitations. The patient has not experienced fatigue, pain in the right side over the liver, arthralgias, myalgias,       ALLERGIES  No Known Allergies    MEDICATIONS  Current Outpatient Prescriptions   Medication Sig    levonorgestrel (MIRENA) 20 mcg/24 hr IUD 1 Each by IntraUTERine route once.  calcium-cholecalciferol, d3, (CALCIUM 600 + D) 600-125 mg-unit tab Take  by mouth.  CYANOCOBALAMIN, VITAMIN B-12, (VITAMIN B-12 PO) Take  by mouth.  multivitamin (ONE A DAY) tablet Take 2 Tabs by mouth daily.  biotin 500 mcg cap Take 1,000 mcg by mouth daily. No current facility-administered medications for this visit. SYSTEM REVIEW NOT RELATED TO LIVER DISEASE OR REVIEWED ABOVE:  Constitution systems: Negative for fever, chills, weight gain, weight loss. Eyes: Negative for visual changes. ENT: Negative for sore throat, painful swallowing. Respiratory: Negative for cough, hemoptysis, SOB. Cardiology: Negative for chest pain, palpitations. GI:  Negative for constipation or diarrhea. : Negative for urinary frequency, dysuria, hematuria, nocturia. Skin: Negative for rash. Hematology: Negative for easy bruising, blood clots. Musculo-skelatal: Negative for back pain, muscle pain, weakness. Neurologic: Negative for headaches, dizziness, vertigo, memory problems not related to HE. Psychology: Negative for anxiety, depression. FAMILY HISTORY:  The patient has no knowledge of the father's medical condition.     The mother has thyroid disease and arthritis. There is no family history of liver disease. SOCIAL HISTORY:  The patient has never been . The patient has no children. The patient has never used tobacco products. The patient consumes alcohol on social occasions never in excess. The patient currently works and takes courses on line. She is in her rubi year. She works full time for [employer] in the [employer] center. PHYSICAL EXAMINATION:  Visit Vitals    /64    Pulse 75    Temp 98.9 °F (37.2 °C) (Tympanic)    Wt 185 lb 12.8 oz (84.3 kg)    LMP 09/01/2017 (Approximate)    SpO2 100%    BMI 30.92 kg/m2     General: No acute distress. Eyes: Sclera anicteric. ENT: No oral lesions. Thyroid normal.  Nodes: No adenopathy. Skin: No spider angiomata. No jaundice. No palmar erythema. Respiratory: Lungs clear to auscultation. Cardiovascular: Regular heart rate. No murmurs. No JVD. Abdomen: Soft non-tender. Liver size normal to percussion/palpation. Spleen not palpable. No obvious ascites. Extremities: No edema. No muscle wasting. No gross arthritic changes. Neurologic: Alert and oriented. Cranial nerves grossly intact. No asterixis.     LABORATORY STUDIES:  Liver [place] of [address] Units 11/24/2017 9/28/2017   WBC 3.4 - 10.8 x10E3/uL  3.7   ANC 1.4 - 7.0 x10E3/uL  1.4   HGB 11.1 - 15.9 g/dL  11.8    - 379 x10E3/uL  195   INR 0.8 - 1.2  1.1   AST 0 - 40 IU/L 49 (H) 12   ALT 0 - 32 IU/L 38 (H) 6   Alk Phos 39 - 117 IU/L 71 96   Bili, Total 0.0 - 1.2 mg/dL 0.3 0.6   Bili, Direct 0.00 - 0.40 mg/dL 0.15 0.34   Albumin 3.5 - 5.5 g/dL 3.7 3.8   BUN 6 - 20 mg/dL 8 8   Creat 0.57 - 1.00 mg/dL 0.65 0.73   Na 134 - 144 mmol/L 138 141   K 3.5 - 5.2 mmol/L 4.3 4.2   Cl 96 - 106 mmol/L 97 102   CO2 18 - 29 mmol/L 26 27   Glucose 65 - 99 mg/dL 73 84     SEROLOGIES:  Serologies Latest Ref Rng & Units 10/14/2015   Hep B Surface Ag Negative Negative   Hep C Ab 0.0 - 0.9 s/co ratio <0.1     Serologies Latest Ref Rng & Units 9/1/2017   Hep A Ab, Total Negative Negative   Hep B Core Ab, Total Negative Negative   Hep B Surface AB QL  Reactive   NABILA, IFA  Positive (A)   NABILA Pattern  1:80   NABILA Pattern  1:80   C-ANCA Neg:<1:20 titer <1:20   P-ANCA Neg:<1:20 titer <1:20   ANCA Neg:<1:20 titer <1:20   ASMCA 0 - 19 Units 20 (H)   M2 Ab 0.0 - 20.0 Units 5.0   LKM 0.0 - 20.0 Units 2.4   Ceruloplasmin 19.0 - 39.0 mg/dL 29.8     LIVER HISTOLOGY:  11/2017. Slides reviewed by MLS. Normal liver. No inflammation, no steatosis, no fibrosis. ENDOSCOPIC PROCEDURES:  Not available or performed    RADIOLOGY:  8/2017. Ultrasound of liver. Echogenic consistent with chronic liver disease. No liver mass lesions. No dilated bile ducts. No ascites. OTHER TESTING:  Not available or performed    ASSESSMENT AND PLAN:  Intermitant elevation in liver transaminases and alkaline phosphate of unclear etiology at this time. The AST, ALT and ALP were as high as 500 and the TBILI was in the 2-3 range when we decided to perform liver biopsy. The liver enzymes were then normal at the time of the biopsy and the biopsy was normal.    Repeat liver enzymes at this office visit demonstrated the liver enzymes were elevated again but only marginally. The liver enzymes will need to be monitored. The most likely causes for the liver chemistry abnormalities were passing gallstones or a non-specific viral process. IT is less likely this was an immune disorder since the biopsy was normal.  Although liver enzymes can fluctuate widely in the early stages of an immune disorder the liver biopsy will show evidence of immune liver injury. The normal liver biopsy makes an immjune liver disease unlikely in this case. We had thought about performing MRCP but did the biopsy instead. Now that the liver enzymes are down I would not do this.     The patient was directed to continue all current medications at the current dosages. There are no contraindications for the patient to take any medications that are necessary for treatment of other medical issues. The patient was counseled regarding alcohol consumption. Vaccination for viral hepatitis A is recommended since the patient has no serologic evidence of previous exposure or vaccination with immunity. Vaccination for viral hepatitis B is not needed. The patient has serologic evidence of prior exposure or vaccination with immunity. All of the above issues were discussed with the patient. All questions were answered. The patient expressed a clear understanding of the above.     [address] [place] [address] in 3 months for monitoring of liver enzymes    Zuri Alarcon MD  Liver [place] [address] [address] Massiel Noel [address] [place] [address] [place]  22.  [phone]

## 2017-11-25 LAB
ALBUMIN SERPL-MCNC: 3.7 G/DL (ref 3.5–5.5)
ALP SERPL-CCNC: 71 IU/L (ref 39–117)
ALT SERPL-CCNC: 38 IU/L (ref 0–32)
AST SERPL-CCNC: 49 IU/L (ref 0–40)
BILIRUB DIRECT SERPL-MCNC: 0.15 MG/DL (ref 0–0.4)
BILIRUB SERPL-MCNC: 0.3 MG/DL (ref 0–1.2)
BUN SERPL-MCNC: 8 MG/DL (ref 6–20)
BUN/CREAT SERPL: 12 (ref 9–23)
CALCIUM SERPL-MCNC: 8.5 MG/DL (ref 8.7–10.2)
CHLORIDE SERPL-SCNC: 97 MMOL/L (ref 96–106)
CO2 SERPL-SCNC: 26 MMOL/L (ref 18–29)
CREAT SERPL-MCNC: 0.65 MG/DL (ref 0.57–1)
GFR SERPLBLD CREATININE-BSD FMLA CKD-EPI: 124 ML/MIN/1.73
GFR SERPLBLD CREATININE-BSD FMLA CKD-EPI: 143 ML/MIN/1.73
GLUCOSE SERPL-MCNC: 73 MG/DL (ref 65–99)
POTASSIUM SERPL-SCNC: 4.3 MMOL/L (ref 3.5–5.2)
PROT SERPL-MCNC: 6.3 G/DL (ref 6–8.5)
SODIUM SERPL-SCNC: 138 MMOL/L (ref 134–144)

## 2019-02-15 PROBLEM — E66.01 SEVERE OBESITY (HCC): Status: ACTIVE | Noted: 2019-02-15

## 2019-06-20 PROBLEM — F31.9 BIPOLAR I DISORDER (HCC): Status: ACTIVE | Noted: 2019-06-04

## 2019-06-20 PROBLEM — E55.9 VITAMIN D DEFICIENCY: Status: ACTIVE | Noted: 2019-06-04

## 2019-06-20 PROBLEM — G89.29 CHRONIC BACK PAIN: Status: ACTIVE | Noted: 2019-06-04

## 2019-06-20 PROBLEM — F90.9 ADULT ATTENTION DEFICIT HYPERACTIVITY DISORDER: Status: ACTIVE | Noted: 2019-06-04

## 2019-06-20 PROBLEM — M54.9 CHRONIC BACK PAIN: Status: ACTIVE | Noted: 2019-06-04

## 2021-02-08 ENCOUNTER — OFFICE VISIT (OUTPATIENT)
Dept: OBGYN CLINIC | Age: 31
End: 2021-02-08
Payer: MEDICAID

## 2021-02-08 VITALS — BODY MASS INDEX: 39.44 KG/M2 | SYSTOLIC BLOOD PRESSURE: 122 MMHG | DIASTOLIC BLOOD PRESSURE: 80 MMHG | WEIGHT: 237 LBS

## 2021-02-08 DIAGNOSIS — Z11.3 SCREENING EXAMINATION FOR SEXUALLY TRANSMITTED DISEASE: ICD-10-CM

## 2021-02-08 DIAGNOSIS — Z01.419 WELL WOMAN EXAM WITH ROUTINE GYNECOLOGICAL EXAM: Primary | ICD-10-CM

## 2021-02-08 PROCEDURE — 99395 PREV VISIT EST AGE 18-39: CPT | Performed by: OBSTETRICS & GYNECOLOGY

## 2021-02-08 NOTE — PROGRESS NOTES
Annual exam ages 21-44    [facility] [place] Hyacinth Foreman is a No obstetric history on file. ,  35 y.o. female   No LMP recorded. (Menstrual status: IUD). She presents for her annual checkup. She is having no significant problems. She wants std testing today. Menstrual status:    Her periods are minimal to none using IUD. Contraception:    The current method of family planning is Hormonal IUS. Sexual history:    She  reports being sexually active and has had partner(s) who are Male. She reports using the following method of birth control/protection: I.U.D..    Medical conditions:    Since her last annual GYN exam about one year ago, she has not the following changes in her health history: none. Surgical history confirmed with patient. has a past surgical history that includes hx gastrectomy (11/20/13); hx gyn (2016); hx wisdom teeth extraction; and ir bx liver percutaneous (2017). Pap and Mammogram History:    Her most recent Pap smear was normal, obtained 4 year(s) ago. The patient has never had a mammogram.      Past Medical History:   Diagnosis Date    Anxiety     Bipolar 1 disorder ([facility] [address] 06/2019    Cervical high risk HPV (human papillomavirus) test positive 03/2016    Chronic back pain 2018    History of sleeve gastrectomy 11/2013    [place]    Low vitamin D level 2015    Pap smear for cervical cancer screening 3/3/16 ASCUS HPV POS    Prediabetes     before sleeve gastrectomy, Metformin    Thyroid disease 2015    was on meds, stopped due to no insurance     Past Surgical History:   Procedure Laterality Date    HX GASTRECTOMY  11/20/13    sleeve by Dr Gopal Klein HX GYN  2016    Mirena (2021)    HX WISDOM TEETH EXTRACTION      IR [address] LIVER PERCUTANEOUS  2017    due to high bilirubin: Neg       Current Outpatient Medications   Medication Sig Dispense Refill    LITHIUM CITRATE PO Take  by mouth.       ARIPiprazole (ABILIFY) 10 mg tablet Abilify 10 mg tablet   Take 1 tablet every day by oral route as directed.  levonorgestrel (MIRENA) 20 mcg/24 hr IUD 1 Each by IntraUTERine route once.  traMADol (ULTRAM) 50 mg tablet tramadol 50 mg tablet   Take 1 tablet every 6 hours by oral route as needed for 20 days.  naloxone (NARCAN) 4 mg/actuation nasal spray Use 1 spray intranasally, then discard. Repeat with new spray every 2 min as needed for opioid overdose symptoms, alternating nostrils. 1 Each 0    dextroamphetamine-amphetamine (ADDERALL) 10 mg tablet dextroamphetamine-amphetamine 10 mg tablet   Take 1 tablet every day by oral route in the morning.  cholecalciferol (VITAMIN D3) 5,000 unit capsule cholecalciferol (vitamin D3) 5,000 unit capsule      CYANOCOBALAMIN, VITAMIN B-12, (VITAMIN B-12 PO) Take  by mouth.  multivitamin (ONE A DAY) tablet Take 2 Tabs by mouth daily.  biotin 500 mcg cap Take 1,000 mcg by mouth daily. Allergies: Patient has no known allergies. Tobacco History:  reports that she has never smoked. She has never used smokeless tobacco.  Alcohol Abuse:  reports current alcohol use of about 3.0 standard drinks of alcohol per week. Drug Abuse:  reports no history of drug use.     Family Medical/Cancer History:   Family History   Problem Relation Age of Onset    Asthma Mother     Anxiety Mother     Arthritis-osteo Mother     Thyroid Disease Mother         hashimotos    Diabetes Maternal Grandmother     Prostate Cancer Maternal Grandfather         Review of Systems - History obtained from the patient  Constitutional: negative for weight loss, fever, night sweats  HEENT: negative for hearing loss, earache, congestion, snoring, sorethroat  CV: negative for chest pain, palpitations, edema  Resp: negative for cough, shortness of breath, wheezing  GI: negative for change in bowel habits, abdominal pain, black or bloody stools  : negative for frequency, dysuria, hematuria, vaginal discharge  MSK: negative for back pain, joint pain, muscle pain  Breast: negative for breast lumps, nipple discharge, galactorrhea  Skin :negative for itching, rash, hives  Neuro: negative for dizziness, headache, confusion, weakness  Psych: negative for anxiety, depression, change in mood  Heme/lymph: negative for bleeding, bruising, pallor    Physical Exam    Visit Vitals  /80   Wt 237 lb (107.5 kg)   BMI 39.44 kg/m²       Constitutional  · Appearance: well-nourished, well developed, alert, in no acute distress    HENT  · Head and Face: appears normal    Neck  · Inspection/Palpation: normal appearance, no masses or tenderness  · Lymph Nodes: no lymphadenopathy present  · Thyroid: gland size normal, nontender, no nodules or masses present on palpation    Chest  · Respiratory Effort: breathing unlabored    Breasts  · Inspection of Breasts: breasts symmetrical, no skin changes, no discharge present, nipple appearance normal, no skin retraction present  · Palpation of Breasts and Axillae: no masses present on palpation, no breast tenderness  · Axillary Lymph Nodes: no lymphadenopathy present    Gastrointestinal  · Abdominal Examination: abdomen non-tender to palpation, normal bowel sounds, no masses present  · Liver and spleen: no hepatomegaly present, spleen not palpable  · Hernias: no hernias identified    Genitourinary  · External Genitalia: normal appearance for age, no discharge present, no tenderness present, no inflammatory lesions present, no masses present, no atrophy present  · Vagina: normal vaginal vault without central or paravaginal defects, no discharge present, no inflammatory lesions present, no masses present  · Bladder: non-tender to palpation  · Urethra: appears normal  · Cervix: normal, iud string appropriate length   · Uterus: normal size, shape and consistency  · Adnexa: no adnexal tenderness present, no adnexal masses present  · Perineum: perineum within normal limits, no evidence of trauma, no rashes or skin lesions present  · Anus: anus within normal limits, no hemorrhoids present  · Inguinal Lymph Nodes: no lymphadenopathy present    Skin  · General Inspection: no rash, no lesions identified    Neurologic/Psychiatric  · Mental Status:  · Orientation: grossly oriented to person, place and time  · Mood and Affect: mood normal, affect appropriate    . Assessment:  Routine gynecologic examination  Her current medical status is satisfactory with no evidence of significant gynecologic issues.     Plan:  Counseled re: diet, exercise, healthy lifestyle  Return for yearly wellness visits  Gardisil counseling provided  Pt counseled regarding co-testing for high risk HPV with pap  Rec screening mammo at either 36 or 41

## 2021-02-10 LAB
C TRACH RRNA CVX QL NAA+PROBE: NEGATIVE
CYTOLOGIST CVX/VAG CYTO: NORMAL
CYTOLOGY CVX/VAG DOC CYTO: NORMAL
CYTOLOGY CVX/VAG DOC THIN PREP: NORMAL
CYTOLOGY HISTORY:: NORMAL
DX ICD CODE: NORMAL
LABCORP, 190119: NORMAL
Lab: NORMAL
N GONORRHOEA RRNA CVX QL NAA+PROBE: NEGATIVE
OTHER STN SPEC: NORMAL
STAT OF ADQ CVX/VAG CYTO-IMP: NORMAL

## 2022-03-18 PROBLEM — G89.29 CHRONIC BACK PAIN: Status: ACTIVE | Noted: 2019-06-04

## 2022-03-18 PROBLEM — F31.9 BIPOLAR I DISORDER (HCC): Status: ACTIVE | Noted: 2019-06-04

## 2022-03-18 PROBLEM — F90.9 ADULT ATTENTION DEFICIT HYPERACTIVITY DISORDER: Status: ACTIVE | Noted: 2019-06-04

## 2022-03-18 PROBLEM — M54.9 CHRONIC BACK PAIN: Status: ACTIVE | Noted: 2019-06-04

## 2022-03-18 NOTE — PROGRESS NOTES
Annual exam ages 22-44    [facility] Catarina Baez is a No obstetric history on file. ,  28 y.o. female   No LMP recorded. (Menstrual status: IUD). She presents for her annual checkup. She is having no significant problems. Menstrual status:    Her periods are minimal to none using IUD. Contraception:    The current method of family planning is Hormonal IUS. Sexual history:    She  reports being sexually active and has had partner(s) who are male. She reports using the following method of birth control/protection: I.U.D..    Medical conditions:    Since her last annual GYN exam about one year ago, she has not the following changes in her health history: none. Surgical history confirmed with patient. has a past surgical history that includes hx gastrectomy (11/20/13); hx gyn (2016); hx wisdom teeth extraction; and ir bx liver percutaneous (2017). Pap and Mammogram History:    Her most recent Pap smear was normal, obtained 1 year(s) ago.     The patient has never had a mammogram.    Breast Cancer History/Substance Abuse: negative    Past Medical History:   Diagnosis Date    Anxiety     Bipolar 2 disorder ([facility] [address] 06/2019    Dr. Bo Ko    Cervical high risk HPV (human papillomavirus) test positive 03/2016    Chronic back pain 2018    History of sleeve gastrectomy 11/2013    Dr. Regine Lee vitamin D level 2015    Pap smear for cervical cancer screening 3/3/16 ASCUS HPV POS    Prediabetes     before sleeve gastrectomy, Metformin    Thyroid disease 2015    was on meds, stopped due to no insurance    Urinary urgency 02/2021    Urology Eval in chart     Past Surgical History:   Procedure Laterality Date    HX GASTRECTOMY  11/20/13    sleeve by Dr Rex Barton GYN  2016    Mirena (2021)    HX WISDOM TEETH EXTRACTION      IR [address] LIVER PERCUTANEOUS  2017    due to high bilirubin: Neg       Current Outpatient Medications   Medication Sig Dispense Refill    metroNIDAZOLE (FLAGYL) 500 mg tablet Take 2 grams today 4 Tablet 0    azithromycin (ZITHROMAX) 500 mg tab Take 1 Tablet by mouth daily. 2 Tablet 0    venlafaxine (EFFEXOR) 37.5 mg tablet TAKE ONE TABLET BY MOUTH DAILY WITH FOOD      hydrOXYzine pamoate (VISTARIL) 25 mg capsule       ARIPiprazole (ABILIFY) 10 mg tablet Abilify 10 mg tablet   Take 1 tablet every day by oral route as directed.  cholecalciferol (VITAMIN D3) 5,000 unit capsule cholecalciferol (vitamin D3) 5,000 unit capsule      CYANOCOBALAMIN, VITAMIN B-12, (VITAMIN B-12 PO) Take  by mouth.  multivitamin (ONE A DAY) tablet Take 2 Tabs by mouth daily.  biotin 500 mcg cap Take 1,000 mcg by mouth daily.  levonorgestrel (MIRENA) 20 mcg/24 hr IUD 1 Each by IntraUTERine route once. Allergies: Patient has no known allergies. Tobacco History:  reports that she has never smoked. She has never used smokeless tobacco.  Alcohol Abuse:  reports current alcohol use of about 3.0 standard drinks of alcohol per week. Drug Abuse:  reports current drug use. Frequency: 4.00 times per week. Drug: Marijuana.     Family Medical/Cancer History:   Family History   Problem Relation Age of Onset    Asthma Mother     Anxiety Mother     OSTEOARTHRITIS Mother     Thyroid Disease Mother         hashimotos    Diabetes Maternal Grandmother     Prostate Cancer Maternal Grandfather         Review of Systems - History obtained from the patient  Constitutional: negative for weight loss, fever, night sweats  HEENT: negative for hearing loss, earache, congestion, snoring, sorethroat  CV: negative for chest pain, palpitations, edema  Resp: negative for cough, shortness of breath, wheezing  GI: negative for change in bowel habits, abdominal pain, black or bloody stools  : negative for frequency, dysuria, hematuria, vaginal discharge  MSK: negative for back pain, joint pain, muscle pain  Breast: negative for breast lumps, nipple discharge, galactorrhea  Skin :negative for itching, rash, hives  Neuro: negative for dizziness, headache, confusion, weakness  Psych: negative for anxiety, depression, change in mood  Heme/lymph: negative for bleeding, bruising, pallor    Physical Exam  Visit Vitals  /65   Wt 239 lb (108.4 kg)   BMI 39.77 kg/m²       Constitutional  · Appearance: well-nourished, well developed, alert, in no acute distress    HENT  · Head and Face: appears normal    Neck  · Inspection/Palpation: normal appearance, no masses or tenderness  · Lymph Nodes: no lymphadenopathy present  · Thyroid: gland size normal, nontender, no nodules or masses present on palpation    Chest  · Respiratory Effort: breathing unlabored    Breasts  · Inspection of Breasts: breasts symmetrical, no skin changes, no discharge present, nipple appearance normal, no skin retraction present  · Palpation of Breasts and Axillae: no masses present on palpation, no breast tenderness  · Axillary Lymph Nodes: no lymphadenopathy present    Gastrointestinal  · Abdominal Examination: abdomen non-tender to palpation, normal bowel sounds, no masses present  · Liver and spleen: no hepatomegaly present, spleen not palpable  · Hernias: no hernias identified    Genitourinary  · External Genitalia: normal appearance for age, no discharge present, no tenderness present, no inflammatory lesions present, no masses present, no atrophy present  · Vagina: normal vaginal vault without central or paravaginal defects, no discharge present, no inflammatory lesions present, no masses present  · Bladder: non-tender to palpation  · Urethra: appears normal  · Cervix: normal, iud strings in proper position    · Uterus: normal size, shape and consistency  · Adnexa: no adnexal tenderness present, no adnexal masses present  · Perineum: perineum within normal limits, no evidence of trauma, no rashes or skin lesions present  · Anus: anus within normal limits, no hemorrhoids present  · Inguinal Lymph Nodes: no lymphadenopathy present    Skin  · General Inspection: no rash, no lesions identified    Neurologic/Psychiatric  · Mental Status:  · Orientation: grossly oriented to person, place and time  · Mood and Affect: mood normal, affect appropriate    Assessment:  Routine gynecologic examination  Her current medical status is satisfactory with no evidence of significant gynecologic issues.     Plan:  Counseled re: diet, exercise, healthy lifestyle  Return for yearly wellness visits  Pt counseled regarding co-testing for high risk HPV with pap  Rec screening mammo at either 36 or 41

## 2022-03-19 PROBLEM — E55.9 VITAMIN D DEFICIENCY: Status: ACTIVE | Noted: 2019-06-04

## 2022-03-19 PROBLEM — E66.01 SEVERE OBESITY (HCC): Status: ACTIVE | Noted: 2019-02-15

## 2022-03-19 PROBLEM — R74.8 ELEVATED LIVER ENZYMES: Status: ACTIVE | Noted: 2017-09-30

## 2022-03-19 PROBLEM — E03.9 HYPOTHYROIDISM: Status: ACTIVE | Noted: 2017-09-01

## 2022-03-21 ENCOUNTER — OFFICE VISIT (OUTPATIENT)
Dept: OBGYN CLINIC | Age: 32
End: 2022-03-21
Payer: MEDICAID

## 2022-03-21 VITALS — BODY MASS INDEX: 39.77 KG/M2 | DIASTOLIC BLOOD PRESSURE: 65 MMHG | SYSTOLIC BLOOD PRESSURE: 117 MMHG | WEIGHT: 239 LBS

## 2022-03-21 DIAGNOSIS — Z11.3 SCREENING EXAMINATION FOR SEXUALLY TRANSMITTED DISEASE: ICD-10-CM

## 2022-03-21 DIAGNOSIS — Z01.419 WELL WOMAN EXAM WITH ROUTINE GYNECOLOGICAL EXAM: Primary | ICD-10-CM

## 2022-03-21 PROCEDURE — 99395 PREV VISIT EST AGE 18-39: CPT | Performed by: OBSTETRICS & GYNECOLOGY

## 2022-03-21 RX ORDER — ZIPRASIDONE HYDROCHLORIDE 20 MG/1
CAPSULE ORAL
COMMUNITY
Start: 2022-03-14 | End: 2022-06-24 | Stop reason: DRUGHIGH

## 2022-03-21 RX ORDER — BUPROPION HYDROCHLORIDE 300 MG/1
TABLET ORAL
COMMUNITY
Start: 2022-03-14

## 2022-03-21 RX ORDER — BUSPIRONE HYDROCHLORIDE 5 MG/1
TABLET ORAL
COMMUNITY
Start: 2022-03-14

## 2022-03-22 LAB
HBV SURFACE AG SERPL QL IA: NEGATIVE
HCV AB S/CO SERPL IA: <0.1 S/CO RATIO (ref 0–0.9)
HIV 1+2 AB+HIV1 P24 AG SERPL QL IA: NON REACTIVE
RPR SER QL: NON REACTIVE

## 2022-03-24 LAB
C TRACH RRNA CVX QL NAA+PROBE: NEGATIVE
CYTOLOGIST CVX/VAG CYTO: NORMAL
CYTOLOGY CVX/VAG DOC CYTO: NORMAL
CYTOLOGY CVX/VAG DOC THIN PREP: NORMAL
CYTOLOGY HISTORY:: NORMAL
DX ICD CODE: NORMAL
HPV I/H RISK 4 DNA CVX QL PROBE+SIG AMP: NEGATIVE
Lab: NORMAL
N GONORRHOEA RRNA CVX QL NAA+PROBE: NEGATIVE
OTHER STN SPEC: NORMAL
STAT OF ADQ CVX/VAG CYTO-IMP: NORMAL
T VAGINALIS RRNA SPEC QL NAA+PROBE: NEGATIVE

## 2022-06-24 PROBLEM — F31.9 BIPOLAR 1 DISORDER (HCC): Status: ACTIVE | Noted: 2019-06-04

## 2022-09-21 ENCOUNTER — OFFICE VISIT (OUTPATIENT)
Dept: OBGYN CLINIC | Age: 32
End: 2022-09-21
Payer: MEDICAID

## 2022-09-21 DIAGNOSIS — Z30.432 ENCOUNTER FOR IUD REMOVAL: ICD-10-CM

## 2022-09-21 DIAGNOSIS — Z30.430 ENCOUNTER FOR IUD INSERTION: Primary | ICD-10-CM

## 2022-09-21 PROCEDURE — 58300 INSERT INTRAUTERINE DEVICE: CPT | Performed by: OBSTETRICS & GYNECOLOGY

## 2022-09-21 PROCEDURE — 58301 REMOVE INTRAUTERINE DEVICE: CPT | Performed by: OBSTETRICS & GYNECOLOGY

## 2022-09-21 NOTE — PROGRESS NOTES
[facility]  OFFICE PROCEDURE PROGRESS NOTE        Chart reviewed for the following:   Nathanael FRIAS, have reviewed the History, Physical and updated the Allergic reactions for [address] performed immediately prior to start of procedure:   Nathanael FRIAS, have performed the following reviews on [address] [place] [address] prior to the start of the procedure:            * Patient was identified by name and date of birth   * Agreement on procedure being performed was verified  * Risks and Benefits explained to the patient  * Procedure site verified and marked as necessary  * Patient was positioned for comfort  * Consent was signed and verified     Time: 1030am      Date of procedure: 9/21/2022    Procedure performed by:  Shae Snyder MD    Provider assisted by: Susan Rice     Patient assisted by: self    How tolerated by patient: tolerated the procedure well with no complications    Post Procedural Pain Scale: 2 - Hurts Little Bit    Comments: none      IUD REPLACEMENT    Indications for Removal:  [address] [place] [address] is a No obstetric history on file. ,  28 y.o. female BLACK/ whose No LMP recorded. (Menstrual status: IUD). was on . who presents today for IUD replacement. Her current IUD was placed 6 years ago. She has had problems with the IUD. She requests replacement of the IUD because of bleeding. The IUD removal procedure was discussed with the patient and she had no further questions. Procedure: The patient was placed in a dorsal lithotomy position and appropriately draped. On bimanual exam the uterus was anterior and normal in size with no tenderness present. A speculum exam was performed and the cervix was visualized. The cervix was prepped with zephiran solution. The IUD string was visualized. Using ring forceps , the string was grasped and the IUD removed intact. The IUD was shown to the patient. IUD INSERTION  Indications:   The risks, benefits and alternatives of IUD insertion were discussed in detail. She also has reviewed Mirena information. She has elected to proceed with the insertion today and she states she has no further questions. Procedure: The pelvic exam revealed normal external genitalia. On bimanual exam the uterus was anteverted and normal in size with no tenderness present. A speculum was inserted into the vagina and the cervix was visualized. The cervix was prepped with zephiran solution. The anterior lip of the cervix was grasped with a single toothed tenaculum. The uterus was sounded with a Chaves sound to 7 centimeters. A Mirena was then inserted without difficulty. The string was cut to 3 centimeters. She experienced a mild amount of cramping. Post Procedure Status: She tolerated the procedure well. The patient received Mirena lot number [id number]. Office supplied IUD.

## 2023-05-20 RX ORDER — TRAZODONE HYDROCHLORIDE 50 MG/1
TABLET ORAL
COMMUNITY
Start: 2022-06-06

## 2023-05-20 RX ORDER — BUPROPION HYDROCHLORIDE 300 MG/1
TABLET ORAL
COMMUNITY
Start: 2022-03-14

## 2023-05-20 RX ORDER — ZIPRASIDONE HYDROCHLORIDE 40 MG/1
CAPSULE ORAL
COMMUNITY
Start: 2022-06-05

## 2023-05-20 RX ORDER — BUSPIRONE HYDROCHLORIDE 5 MG/1
TABLET ORAL
COMMUNITY
Start: 2022-03-14

## 2023-05-20 RX ORDER — CHOLECALCIFEROL (VITAMIN D3) 125 MCG
CAPSULE ORAL
COMMUNITY
Start: 2022-06-06

## 2024-02-27 ENCOUNTER — TELEPHONE (OUTPATIENT)
Age: 34
End: 2024-02-27

## 2024-02-27 NOTE — TELEPHONE ENCOUNTER
Returned patients call requesting an appointment for a second opinion.  She had GB surgery in November 2023. Made patient aware that we will need medical records prior to scheduling an appointment.  Provided patient with fax number to have records sent.

## 2024-03-19 ENCOUNTER — OFFICE VISIT (OUTPATIENT)
Age: 34
End: 2024-03-19
Payer: MEDICAID

## 2024-03-19 VITALS
SYSTOLIC BLOOD PRESSURE: 136 MMHG | DIASTOLIC BLOOD PRESSURE: 86 MMHG | RESPIRATION RATE: 18 BRPM | OXYGEN SATURATION: 98 % | BODY MASS INDEX: 41.82 KG/M2 | HEIGHT: 65 IN | HEART RATE: 86 BPM | WEIGHT: 251 LBS | TEMPERATURE: 98.4 F

## 2024-03-19 DIAGNOSIS — E61.1 IRON DEFICIENCY: ICD-10-CM

## 2024-03-19 DIAGNOSIS — E66.01 MORBID OBESITY WITH BMI OF 40.0-44.9, ADULT (HCC): ICD-10-CM

## 2024-03-19 DIAGNOSIS — E55.9 VITAMIN D DEFICIENCY: ICD-10-CM

## 2024-03-19 DIAGNOSIS — E66.01 MORBID OBESITY WITH BMI OF 40.0-44.9, ADULT (HCC): Primary | ICD-10-CM

## 2024-03-19 DIAGNOSIS — E53.8 B12 DEFICIENCY: ICD-10-CM

## 2024-03-19 DIAGNOSIS — K91.2 POSTOPERATIVE INTESTINAL MALABSORPTION: ICD-10-CM

## 2024-03-19 PROCEDURE — 99204 OFFICE O/P NEW MOD 45 MIN: CPT | Performed by: NURSE PRACTITIONER

## 2024-03-19 RX ORDER — UBIDECARENONE 75 MG
50 CAPSULE ORAL DAILY
COMMUNITY

## 2024-03-19 RX ORDER — ASCORBIC ACID 500 MG
500 TABLET ORAL DAILY
COMMUNITY

## 2024-03-19 NOTE — PATIENT INSTRUCTIONS
Make an appointment with one of the bariatric dietitians, please call the bariatric line at [phone].  Appointments are offered in person and virtual at no charge.    Get your labs done and I will follow up once I get the results    Non-negotiable's:    - HYDRATION: aim for 2 liters (64 oz) water per day     - PROTEIN: goal is minimum 60 grams per day and spread throughout the day.     - VITAMINS: take your bariatric vitamins daily     - SLEEP: 7-8 hours nightly     - ACTIVITY: move your body every day      Tracking is a great tool to help you use your surgery and meet the \"non-negotiable's\"     Share0ic is a free  ben and easy to use     Keep a \"photo\" journal by taking a picture of your food     Planning and consistency are  key to using your surgery and feeling well.     Avoid ALL NSAID'S, including:  Advil, Motrin, Aleve, BC Powders, Excedrin, Naproxen, Ibuprofen, Goodies, Voltaren, Mobic, Diclofenac, etc.      If you are given a new prescription for pain ask if it is a NSAID (non-steroidal antiinflammatory drug).  If it is, you should not take after having gastric bypass.      Tylenol is OK     Avoid alcohol

## 2024-03-22 ASSESSMENT — ENCOUNTER SYMPTOMS
APNEA: 0
ABDOMINAL DISTENTION: 0
ABDOMINAL PAIN: 0
CONSTIPATION: 0

## 2024-03-22 NOTE — PROGRESS NOTES
Identified patient with two patient identifiers (name and ). Reviewed chart in preparation for visit and have obtained necessary documentation.    Rosita Gage is a 33 y.o. female  Chief Complaint   Patient presents with    New Patient     4 months s/p lap sleeve to lap gastric bypass     /86 (Site: Left Upper Arm, Position: Sitting, Cuff Size: Large Adult)   Pulse 86   Temp 98.4 °F (36.9 °C)   Resp 18   Ht 1.651 m (5' 5\")   Wt 113.9 kg (251 lb)   SpO2 98%   BMI 41.77 kg/m²     1. Have you been to the ER, urgent care clinic since your last visit?  Hospitalized since your last visit?new patient    2. Have you seen or consulted any other health care providers outside of the [facility] since your last visit?  Include any pap smears or colon screening. New patient  
  Problem Relation Age of Onset    Prostate Cancer Maternal Grandfather     Diabetes Maternal Grandmother     Thyroid Disease Mother         hashimotos    Osteoarthritis Mother     Anxiety Disorder Mother     Asthma Mother      Social History     Socioeconomic History    Marital status: Single     Spouse name: Not on file    Number of children: Not on file    Years of education: Not on file    Highest education level: Not on file   Occupational History    Not on file   Tobacco Use    Smoking status: Never    Smokeless tobacco: Never   Substance and Sexual Activity    Alcohol use: Yes     Alcohol/week: 7.0 - 9.0 standard drinks of alcohol     Types: 4 - 6 Glasses of wine, 3 Standard drinks or equivalent per week    Drug use: Yes     Frequency: 7.0 times per week     Types: Marijuana (Weed)    Sexual activity: Not on file     Comment: mirena \"GIGI\"   Other Topics Concern    Not on file   Social History Narrative    Single, no children.  Lives with friend.  Taking online Associates degree.      Works full-time for [employer] 4 10-hour days     Social Determinants of Health     Financial Resource Strain: Not on file   Food Insecurity: Not on file   Transportation Needs: Not on file   Physical Activity: Not on file   Stress: Not on file   Social Connections: Not on file   Intimate Partner Violence: Not on file   Housing Stability: Not on file       Current Outpatient Medications:     Multiple Vitamins-Minerals (BARIATRIC MULTIVITAMINS/IRON PO), Take by mouth Fusion, Disp: , Rfl:     vitamin B-12 (CYANOCOBALAMIN) 100 MCG tablet, Take 0.5 tablets by mouth daily, Disp: , Rfl:     vitamin C (ASCORBIC ACID) 500 MG tablet, Take 1 tablet by mouth daily, Disp: , Rfl:     Collagen-Vitamin C-Biotin (COLLAGEN 1500/C PO), Take by mouth, Disp: , Rfl:     buPROPion (WELLBUTRIN XL) 300 MG extended release tablet, ceived the following from [facility]: Outside name: buPROPion XL (WELLBUTRIN XL) 300 mg XL tablet, Disp: ,

## 2024-03-25 LAB
25(OH)D3+25(OH)D2 SERPL-MCNC: 47.8 NG/ML (ref 30–100)
ALBUMIN SERPL-MCNC: 4.2 G/DL (ref 3.9–4.9)
ALBUMIN/GLOB SERPL: 1.5 {RATIO} (ref 1.2–2.2)
ALP SERPL-CCNC: 111 IU/L (ref 44–121)
ALT SERPL-CCNC: 21 IU/L (ref 0–32)
AST SERPL-CCNC: 25 IU/L (ref 0–40)
BASOPHILS # BLD AUTO: 0 X10E3/UL (ref 0–0.2)
BASOPHILS NFR BLD AUTO: 1 %
BILIRUB SERPL-MCNC: 0.3 MG/DL (ref 0–1.2)
BUN SERPL-MCNC: 10 MG/DL (ref 6–20)
BUN/CREAT SERPL: 14 (ref 9–23)
CALCIUM SERPL-MCNC: 9 MG/DL (ref 8.7–10.2)
CHLORIDE SERPL-SCNC: 100 MMOL/L (ref 96–106)
CO2 SERPL-SCNC: 23 MMOL/L (ref 20–29)
CREAT SERPL-MCNC: 0.69 MG/DL (ref 0.57–1)
EGFRCR SERPLBLD CKD-EPI 2021: 118 ML/MIN/1.73
EOSINOPHIL # BLD AUTO: 0.2 X10E3/UL (ref 0–0.4)
EOSINOPHIL NFR BLD AUTO: 3 %
ERYTHROCYTE [DISTWIDTH] IN BLOOD BY AUTOMATED COUNT: 11.7 % (ref 11.7–15.4)
FERRITIN SERPL-MCNC: 55 NG/ML (ref 15–150)
FOLATE SERPL-MCNC: 14.9 NG/ML
GLOBULIN SER CALC-MCNC: 2.8 G/DL (ref 1.5–4.5)
GLUCOSE SERPL-MCNC: 84 MG/DL (ref 70–99)
HCT VFR BLD AUTO: 38.5 % (ref 34–46.6)
HGB BLD-MCNC: 12.9 G/DL (ref 11.1–15.9)
IMM GRANULOCYTES # BLD AUTO: 0 X10E3/UL (ref 0–0.1)
IMM GRANULOCYTES NFR BLD AUTO: 0 %
IRON SATN MFR SERPL: 36 % (ref 15–55)
IRON SERPL-MCNC: 117 UG/DL (ref 27–159)
LYMPHOCYTES # BLD AUTO: 2.4 X10E3/UL (ref 0.7–3.1)
LYMPHOCYTES NFR BLD AUTO: 45 %
MCH RBC QN AUTO: 33.3 PG (ref 26.6–33)
MCHC RBC AUTO-ENTMCNC: 33.5 G/DL (ref 31.5–35.7)
MCV RBC AUTO: 100 FL (ref 79–97)
MONOCYTES # BLD AUTO: 0.4 X10E3/UL (ref 0.1–0.9)
MONOCYTES NFR BLD AUTO: 8 %
NEUTROPHILS # BLD AUTO: 2.3 X10E3/UL (ref 1.4–7)
NEUTROPHILS NFR BLD AUTO: 43 %
PLATELET # BLD AUTO: 229 X10E3/UL (ref 150–450)
POTASSIUM SERPL-SCNC: 4.1 MMOL/L (ref 3.5–5.2)
PROT SERPL-MCNC: 7 G/DL (ref 6–8.5)
RBC # BLD AUTO: 3.87 X10E6/UL (ref 3.77–5.28)
SODIUM SERPL-SCNC: 140 MMOL/L (ref 134–144)
TIBC SERPL-MCNC: 326 UG/DL (ref 250–450)
UIBC SERPL-MCNC: 209 UG/DL (ref 131–425)
VIT B12 SERPL-MCNC: >2000 PG/ML (ref 232–1245)
WBC # BLD AUTO: 5.4 X10E3/UL (ref 3.4–10.8)

## 2024-04-23 ENCOUNTER — HOSPITAL ENCOUNTER (EMERGENCY)
Facility: HOSPITAL | Age: 34
Discharge: HOME | End: 2024-04-23
Payer: MEDICAID

## 2024-04-23 VITALS
WEIGHT: 250 LBS | SYSTOLIC BLOOD PRESSURE: 132 MMHG | HEART RATE: 90 BPM | DIASTOLIC BLOOD PRESSURE: 101 MMHG | OXYGEN SATURATION: 98 % | BODY MASS INDEX: 41.6 KG/M2 | TEMPERATURE: 98.3 F | RESPIRATION RATE: 18 BRPM

## 2024-04-23 DIAGNOSIS — R21 RASH AND NONSPECIFIC SKIN ERUPTION: ICD-10-CM

## 2024-04-23 DIAGNOSIS — B37.2 CANDIDAL INTERTRIGO: Primary | ICD-10-CM

## 2024-04-23 PROCEDURE — 99283 EMERGENCY DEPT VISIT LOW MDM: CPT

## 2024-04-23 RX ORDER — NYSTATIN 100000 U/G
OINTMENT TOPICAL
Qty: 30 G | Refills: 0 | Status: SHIPPED | OUTPATIENT
Start: 2024-04-23

## 2024-04-23 ASSESSMENT — PAIN DESCRIPTION - ORIENTATION: ORIENTATION: RIGHT

## 2024-04-23 ASSESSMENT — PAIN DESCRIPTION - DESCRIPTORS: DESCRIPTORS: ITCHING

## 2024-04-23 ASSESSMENT — PAIN - FUNCTIONAL ASSESSMENT: PAIN_FUNCTIONAL_ASSESSMENT: 0-10

## 2024-04-23 ASSESSMENT — PAIN DESCRIPTION - LOCATION: LOCATION: BREAST

## 2024-04-23 ASSESSMENT — PAIN SCALES - GENERAL: PAINLEVEL_OUTOF10: 7

## 2024-04-23 ASSESSMENT — PAIN DESCRIPTION - PAIN TYPE: TYPE: ACUTE PAIN

## 2024-04-23 NOTE — ED PROVIDER NOTES
[facility] EMERGENCY DEPT  EMERGENCY DEPARTMENT ENCOUNTER         Pt Name: Rosita Gage  MRN: [id number]  Birthdate 1990  Date of evaluation: 4/23/2024  Provider: Marietta Cooper PA-C   PCP: Ava Solo PA  Note Started: 8:53 AM EDT 4/23/24     CHIEF COMPLAINT       Chief Complaint   Patient presents with    Rash        HISTORY OF PRESENT ILLNESS: 1 or more elements      History From: Patient  HPI Limitations: None     Rosita Gage is a 33 y.o. female who presents for rash under the right breast and the buttock area x 1.5 weeks     Nursing Notes were all reviewed and agreed with or any disagreements were addressed in the HPI.  Please see MDM for additional details of HPI and ROS     REVIEW OF SYSTEMS      Review of Systems     Positives and Pertinent negatives as per HPI.    PAST HISTORY     Past Medical History:  Past Medical History:   Diagnosis Date    Anxiety     Bipolar 2 disorder (HCC) 06/2019    [facility] Psychiatrist     Cervical high risk HPV (human papillomavirus) test positive 03/2016    Chronic back pain 2018    Depression     Diabetes mellitus (HCC)     History of sleeve gastrectomy 11/2013    Dr. Urrutia    Low vitamin D level 2015    Pap smear for cervical cancer screening 03/03/2016    ASCUS HPV POS    Prediabetes     before sleeve gastrectomy, Metformin    Thyroid disease 2015    was on meds, stopped due to no insurance    Urinary urgency 02/2021    Urology Eval in chart       Past Surgical History:  Past Surgical History:   Procedure Laterality Date    GASTRECTOMY  11/20/13    sleeve by Dr Urrutia    GYN  2016    Mirena (2021)    IR BIOPSY LIVER PERCUTANEOUS  2017    due to high bilirubin: Neg    PEDRITO-EN-Y GASTRIC BYPASS  11/13/2023    revision of sleeve to lap gastric bypass at [facility]    WISDOM TOOTH EXTRACTION         Family History:  Family History   Problem Relation Age of Onset    Prostate Cancer Maternal Grandfather     Diabetes Maternal Grandmother     Thyroid Disease

## 2024-04-23 NOTE — ED NOTES
Pt presents to ED ambulatory complaining of rash under the Right breast and in between the gluteal folds. Pt states that the rash occurred a week ago and gradually increased in size. Pt reports discomfort under the right breast when she bends down. Pt is alert and oriented x 4, RR even and unlabored, skin is warm and dry. Assessment completed and pt updated on plan of care.  Call bell in reach.        Emergency Department Nursing Plan of Care       The Nursing Plan of Care is developed from the Nursing assessment and Emergency Department Attending provider initial evaluation.  The plan of care may be reviewed in the “ED Provider note”.    The Plan of Care was developed with the following considerations:   Patient / Family readiness to learn indicated by:verbalized understanding  Persons(s) to be included in education: patient  Barriers to Learning/Limitations:None    Signed

## 2024-04-23 NOTE — ED NOTES
Discharge instructions were given to the patient by Shante SAVAGE. The patient left the Emergency Department ambulatory, alert and oriented and in no acute distress with 2 prescriptions. The patient was encouraged to call or return to the ED for worsening issues or problems and was encouraged to schedule a follow up appointment for continuing care. The patient verbalized understanding of discharge instructions and prescriptions, all questions were answered. The patient has no further concerns at this time.

## 2024-04-23 NOTE — ED TRIAGE NOTES
Patient here for evaluation of a red rash under her right breast and between her buttocks that started a week ago.

## (undated) DEVICE — SYRINGE MED 20ML STD CLR PLAS LUERLOCK TIP N CTRL DISP

## (undated) DEVICE — BAG BELONG PT PERS CLEAR HANDL

## (undated) DEVICE — NEONATAL-ADULT SPO2 SENSOR: Brand: NELLCOR

## (undated) DEVICE — TRAY BX SFT TISS W/ RUL ALC PREP PD FEN DRP TWL LUERLOCK

## (undated) DEVICE — KIT IV STRT W CHLORAPREP PD 1ML

## (undated) DEVICE — MAX-CORE® DISPOSABLE CORE BIOPSY INSTRUMENT, 16G X 16CM: Brand: MAX-CORE

## (undated) DEVICE — CATH IV AUTOGRD BC BLU 22GA 25 -- INSYTE

## (undated) DEVICE — KENDALL RADIOLUCENT FOAM MONITORING ELECTRODE -RECTANGULAR SHAPE: Brand: KENDALL

## (undated) DEVICE — CONTAINER SPEC 20 ML LID NEUT BUFF FORMALIN 10 % POLYPR STS